# Patient Record
Sex: MALE | Race: OTHER | HISPANIC OR LATINO | Employment: FULL TIME | URBAN - METROPOLITAN AREA
[De-identification: names, ages, dates, MRNs, and addresses within clinical notes are randomized per-mention and may not be internally consistent; named-entity substitution may affect disease eponyms.]

---

## 2018-04-13 LAB
ABSOL LYMPHOCYTES (HISTORICAL): 4.9 K/UL (ref 0.5–4)
ALBUMIN SERPL BCP-MCNC: 4.9 G/DL (ref 3–5.2)
ALP SERPL-CCNC: 66 U/L (ref 43–122)
ALT SERPL W P-5'-P-CCNC: 27 U/L (ref 9–52)
ANION GAP SERPL CALCULATED.3IONS-SCNC: 14 MMOL/L (ref 5–14)
AST SERPL W P-5'-P-CCNC: 36 U/L (ref 17–59)
BILIRUB SERPL-MCNC: 0.5 MG/DL
BUN SERPL-MCNC: 18 MG/DL (ref 5–25)
CALCIUM SERPL-MCNC: 9.6 MG/DL (ref 8.4–10.2)
CHLORIDE SERPL-SCNC: 102 MEQ/L (ref 97–108)
CHOLEST SERPL-MCNC: 131 MG/DL
CHOLEST/HDLC SERPL: 2.5 {RATIO}
CO2 SERPL-SCNC: 25 MMOL/L (ref 22–30)
COMMENT (HISTORICAL): ABNORMAL
CREATINE, SERUM (HISTORICAL): 1.09 MG/DL (ref 0.7–1.5)
DEPRECATED RDW RBC AUTO: 13.2 %
EGFR (HISTORICAL): >60 ML/MIN/1.73 M2
EOSINOPHIL # BLD AUTO: 0.1 K/UL (ref 0–0.4)
EOSINOPHIL NFR BLD AUTO: 1 % (ref 0–6)
GLUCOSE SERPL-MCNC: 113 MG/DL (ref 70–99)
HCT VFR BLD AUTO: 44.8 % (ref 41–53)
HDLC SERPL-MCNC: 52 MG/DL
HEPATITIS A IGM ANTIBODY (HISTORICAL): NORMAL
HEPATITIS B CORE IGM ANTIBODY (HISTORICAL): NORMAL
HEPATITIS B SURFACE AG CONFIRMATION (HISTORICAL): NORMAL
HEPATITIS C ANTIBODY (HISTORICAL): NORMAL
HGB BLD-MCNC: 14.1 G/DL (ref 13.5–17.5)
LDL/HDL RATIO (HISTORICAL): 1.3
LDLC SERPL CALC-MCNC: 66 MG/DL
LYMPHOCYTES NFR BLD AUTO: 68 % (ref 25–45)
MCH RBC QN AUTO: 27.4 PG (ref 26–34)
MCHC RBC AUTO-ENTMCNC: 31.5 % (ref 31–36)
MCV RBC AUTO: 87 FL (ref 80–100)
MONOCYTES # BLD AUTO: 0.5 K/UL (ref 0.2–0.9)
MONOCYTES NFR BLD AUTO: 7 % (ref 1–10)
NEUTROPHILS ABS COUNT (HISTORICAL): 1.8 K/UL (ref 1.8–7.8)
NEUTS SEG NFR BLD AUTO: 24 % (ref 45–65)
PLATELET # BLD AUTO: 269 K/MCL (ref 150–450)
POTASSIUM SERPL-SCNC: 4.3 MEQ/L (ref 3.6–5)
RBC # BLD AUTO: 5.15 M/MCL (ref 4.5–5.9)
RBC MORPHOLOGY (HISTORICAL): ABNORMAL
SODIUM SERPL-SCNC: 141 MEQ/L (ref 137–147)
TOTAL PROTEIN (HISTORICAL): 7.9 G/DL (ref 5.9–8.4)
TRIGL SERPL-MCNC: 66 MG/DL
TSH SERPL DL<=0.05 MIU/L-ACNC: 0.75 UIU/ML (ref 0.47–4.68)
VLDLC SERPL CALC-MCNC: 13 MG/DL (ref 0–40)
WBC # BLD AUTO: 7.3 K/MCL (ref 4.5–11)

## 2018-04-15 LAB
EST. AVERAGE GLUCOSE BLD GHB EST-MCNC: 137 MG/DL
HBA1C MFR BLD HPLC: 6.4 %

## 2018-10-11 RX ORDER — PRAVASTATIN SODIUM 40 MG
1 TABLET ORAL
COMMUNITY
Start: 2018-04-13 | End: 2018-10-12 | Stop reason: SDUPTHER

## 2018-10-11 RX ORDER — ASPIRIN 81 MG/1
1 TABLET, CHEWABLE ORAL
COMMUNITY
Start: 2018-04-13 | End: 2018-10-12 | Stop reason: SDUPTHER

## 2018-10-11 RX ORDER — OLMESARTAN MEDOXOMIL, AMLODIPINE AND HYDROCHLOROTHIAZIDE TABLET 40/10/25 MG 40; 10; 25 MG/1; MG/1; MG/1
1 TABLET ORAL EVERY 24 HOURS
COMMUNITY
Start: 2018-04-13 | End: 2018-10-12 | Stop reason: SDUPTHER

## 2018-10-12 ENCOUNTER — OFFICE VISIT (OUTPATIENT)
Dept: FAMILY MEDICINE CLINIC | Facility: CLINIC | Age: 63
End: 2018-10-12
Payer: COMMERCIAL

## 2018-10-12 ENCOUNTER — APPOINTMENT (OUTPATIENT)
Dept: LAB | Facility: HOSPITAL | Age: 63
End: 2018-10-12
Payer: COMMERCIAL

## 2018-10-12 ENCOUNTER — TELEPHONE (OUTPATIENT)
Dept: FAMILY MEDICINE CLINIC | Facility: CLINIC | Age: 63
End: 2018-10-12

## 2018-10-12 VITALS
RESPIRATION RATE: 16 BRPM | OXYGEN SATURATION: 98 % | HEIGHT: 69 IN | HEART RATE: 81 BPM | WEIGHT: 180 LBS | BODY MASS INDEX: 26.66 KG/M2 | SYSTOLIC BLOOD PRESSURE: 130 MMHG | DIASTOLIC BLOOD PRESSURE: 80 MMHG | TEMPERATURE: 98 F

## 2018-10-12 DIAGNOSIS — I10 ESSENTIAL HYPERTENSION: ICD-10-CM

## 2018-10-12 DIAGNOSIS — R73.9 HYPERGLYCEMIA: Primary | ICD-10-CM

## 2018-10-12 DIAGNOSIS — R73.9 HYPERGLYCEMIA: ICD-10-CM

## 2018-10-12 LAB
ALBUMIN SERPL BCP-MCNC: 5.1 G/DL (ref 3–5.2)
ALP SERPL-CCNC: 66 U/L (ref 43–122)
ALT SERPL W P-5'-P-CCNC: 25 U/L (ref 9–52)
ANION GAP SERPL CALCULATED.3IONS-SCNC: 9 MMOL/L (ref 5–14)
AST SERPL W P-5'-P-CCNC: 38 U/L (ref 17–59)
BILIRUB SERPL-MCNC: 0.7 MG/DL
BUN SERPL-MCNC: 18 MG/DL (ref 5–25)
CALCIUM SERPL-MCNC: 10.1 MG/DL (ref 8.4–10.2)
CHLORIDE SERPL-SCNC: 101 MMOL/L (ref 97–108)
CHOLEST SERPL-MCNC: 132 MG/DL
CO2 SERPL-SCNC: 32 MMOL/L (ref 22–30)
CREAT SERPL-MCNC: 1.04 MG/DL (ref 0.7–1.5)
EST. AVERAGE GLUCOSE BLD GHB EST-MCNC: 128 MG/DL
GFR SERPL CREATININE-BSD FRML MDRD: 77 ML/MIN/1.73SQ M
GLUCOSE SERPL-MCNC: 113 MG/DL (ref 70–99)
HBA1C MFR BLD: 6.1 % (ref 4.2–6.3)
HDLC SERPL-MCNC: 54 MG/DL (ref 40–59)
LDLC SERPL CALC-MCNC: 67 MG/DL
POTASSIUM SERPL-SCNC: 4.4 MMOL/L (ref 3.6–5)
PROT SERPL-MCNC: 8.8 G/DL (ref 5.9–8.4)
SODIUM SERPL-SCNC: 142 MMOL/L (ref 137–147)
TRIGL SERPL-MCNC: 53 MG/DL

## 2018-10-12 PROCEDURE — 80061 LIPID PANEL: CPT

## 2018-10-12 PROCEDURE — 36415 COLL VENOUS BLD VENIPUNCTURE: CPT

## 2018-10-12 PROCEDURE — 80053 COMPREHEN METABOLIC PANEL: CPT

## 2018-10-12 PROCEDURE — 83036 HEMOGLOBIN GLYCOSYLATED A1C: CPT

## 2018-10-12 PROCEDURE — 3075F SYST BP GE 130 - 139MM HG: CPT | Performed by: FAMILY MEDICINE

## 2018-10-12 PROCEDURE — 3079F DIAST BP 80-89 MM HG: CPT | Performed by: FAMILY MEDICINE

## 2018-10-12 PROCEDURE — 99213 OFFICE O/P EST LOW 20 MIN: CPT | Performed by: FAMILY MEDICINE

## 2018-10-12 RX ORDER — OLMESARTAN MEDOXOMIL, AMLODIPINE AND HYDROCHLOROTHIAZIDE TABLET 40/10/25 MG 40; 10; 25 MG/1; MG/1; MG/1
1 TABLET ORAL EVERY 24 HOURS
Qty: 30 TABLET | Refills: 5 | Status: SHIPPED | OUTPATIENT
Start: 2018-10-12 | End: 2019-01-24 | Stop reason: SDUPTHER

## 2018-10-12 RX ORDER — PRAVASTATIN SODIUM 40 MG
40 TABLET ORAL DAILY
Qty: 30 TABLET | Refills: 5 | Status: SHIPPED | OUTPATIENT
Start: 2018-10-12 | End: 2019-01-24 | Stop reason: SDUPTHER

## 2018-10-12 RX ORDER — ASPIRIN 81 MG/1
81 TABLET, CHEWABLE ORAL DAILY
Qty: 30 TABLET | Refills: 5 | Status: SHIPPED | OUTPATIENT
Start: 2018-10-12 | End: 2019-01-24 | Stop reason: ALTCHOICE

## 2018-10-12 NOTE — PATIENT INSTRUCTIONS
Plan de alimentación con "enfoque dietético para detener la hipertensión (DASH, por mya siglas en inglés)   CUIDADO AMBULATORIO:   El plan de alimentación DASH (enfoques dietéticos para detener la hipertensión)  está diseñado para ayudar a prevenir o disminuir la presión arterial clotilde  También puede ayudar a bajar el colesterol viktor (colesterol LDL) y disminuír dickson riesgo de enfermedad cardíaca  El plan es bajo en sodio, azúcar, grasas dañinas, y grasas en dickson totalidad  Es alto en potasio, calcio, magnesio y [place]  Estos nutrientes se agregan al consumir más frutas, vegetales y granos enteros  Dickson límite de sodio cada día: Dickson dietista le indicará la cantidad de sodio que usted debe consumir a diario  La gente que tiene la presión arterial clotilde debe consumir de 1,500 a 2,300 mg de sodio al día thelma faviola  Umair cucharadita (cdta) de sal tiene 2,300 mg de sodio  [place] puede parecer thelma umair meta difícil, demi pequeños cambios en los alimentos que usted consume pueden hacer umair gran diferencia  Dickson médico o dietista puede ayudarlo a crear un plan alimenticio que cumpla dickson límite de sodio  Formas de limitar el sodio:   · [place] las etiquetas de los alimentos  Las etiquetas pueden ayudarle a escoger alimentos bajos en sodio  La cantidad de sodio está incluida en miligramos (mg)  La columna del porcentaje de valor diario indica la cantidad de necesidades diarias satisfechas con 1 porción del alimento para cada nutriente en la lista  Escoja alimentos que tengan menos de 5% del porcentaje diario de [place]  Estos alimentos se consideran bajos en sodio  Los alimentos que tienen 20% o más del porcentaje diario de sodio se consideran alimentos altos en sodio  Evite alimentos que tengan más de 300 mg de sodio por porción  Escoja alimentos Maggie Dario diga que son bajos en sodio, con sodio reducido, o sin sal agregada             · Evite la sal   No le agregue sal a la comida cuando se siente a la montiel a comer, y agregue muy poca sal a los alimentos maureen la cocción  Use hierbas y condimentos, thelma cebollas, ajo y especias sin sal para agregar sabor a los alimentos  Use jugo de lima, richelle o vinagre para darle a los alimentos un sabor ácido  Use chiles picantes o umair cantidad pequeña de salsa picante para agregar un sabor picante a los alimentos  · Pregunte sobre los sustitutos para la sal   Pregúntele a dickson médico si es posible usar sustitutos de la sal  Algunos sustitutos de la sal vienen con ingredientes que pueden ser dañinos si usted tiene ciertos padecimientos médicos  · Escoja los alimentos cuidadosamente cuando sale a comer a restaurantes:  las comidas de los restaurantes, sobre todo restaurantes de comida rápida, carlos siempre son altas en sodio  Algunos restaurantes ofrecen información nutricional que indica la cantidad de sodio en mya alimentos  Pida que preparen mya comidas con menos sal o sin sal   Lo que necesita saber acerca de las grasas:   · Incluya grasas saludables  Las grasas insaturadas y los ácidos grasos omega-3 son ejemplos de grasas saludables  Las grasas insaturadas se encuentran en los aceites de soja, canola, [place] o [place] y la margarina [facility] y [facility]  Los ácidos grasos [place] 3 se encuentran en el pescado con grasa, thelma el salmón, el atún, la caballa y las joy  También se le puede encontrar en el aceita de linaza y en la linaza molida  · Evite las grasas insalubres  No consuma grasas dañinas, thelma grasas saturadas y grasas trans  Las grasas saturadas se encuentran en alimentos que contienen grasa animal  Zuleyka Alcantar son [facility] grasosas, la [place], la [facility] [place], la crema y otros productos lácteos  Además se pueden encontrar en la [place], la margarina en brendan, el aceite de pool y el aceite de gi  Las grasas trans se encuentran en comidas fritas, galletas estilo soda, tortillitas tostadas, y alimentos horneados hechos con [place] Arrant    Alimentos que puede incluir:  Con el plan de alimentación DASH, usted necesita consumir un número específico de porciones de cada vinnie de alimentos  [place] le ayudará a consumir las cantidades suficientes de ciertos nutrientes y limitar otros  La cantidad de porciones que usted debe comer depende de la cantidad de calorías que usted necesita  [place] dietista le informará sobre cuántas calorías necesita usted  El número de porciones que viene en la lista al lado de los grupos alimenticios a continuación es para las personas que necesitan aproximadamente 2,000 calorías al día    · Granos:  6 a 8 porciones (3 de estas porciones deben ser de alimentos de granos enteros o integrales)    ¨ 1 tajada de pan integral     ¨ 1 onza de cereal seco    ¨ ½ taza de cereal cocinado, pasta, o arroz integral    · Verduras y frutas:  4 a 5 porciones de frutas y 4 a 5 porciones de vegetales    ¨ 1 fruta mediana    ¨ ½ taza de vegetales congelados, enlatados (sin sal adicional), o vegetales frescos y picados     ¨ ½ taza de fruta fresca, congelada, seca o enlatada (enlatada en sirope liviano o jugo de fruta)    ¨ ½ taza de jugo de verduras o frutas    · Productos lácteos:  2 a 3 porciones    ¨ 1 taza de [employer] o leche 1%    ¨ 1½ onzas de queso descremado o bajo en grasas y bajo en sodio    ¨ 6 onzas de yogurt descremado o bajo en grasas    · Sarmad baron, sarmad feroz y pescado magros:  6 onzas o menos    ¨ Sarmad feroz (elise, pavo) sin piel    ¨ Pescado (sobre todo pescado con grasa, thelma salmón, atún fresco o FUENTES)    ¨ Carne de res o de cerdo magra (lomo, carne molida extra [facility])    ¨ Claras de huevo y sustitutos del huevo    · Douglas du sac, semillas y legumbres:  4 a 5 porciones por semana    ¨ ½ taza de frijoles y arbejas cocinadas    ¨ 1½ onzas de nueces sin sal    ¨ 2 cucharadas de mantequilla de maní o semillas    · Dulces y azúcares adicionales:  5 o menos por semana    ¨ 1 cucharada de azúcar, jalea o mermelada    ¨ ½ taza de sorbeto o gelatina    ¨ 1 taza de limonada    · Grasas:  2 a 3 porciones por semana    ¨ 1 cucharadita de margarina suave o aceite vegetal    ¨ 1 cucharada de [facility]    ¨ 2 cucharadas de aderezo para ensaladas  Alimentos que se deben evitar:   · Granos:      ¨ Postres horneados o fritos thelma donas, pastelitos, galletas, y quequitos (altos en grasas y azúcar)    ¨ Mezclas para hacer pan de maíz y pasteles, alimentos empacados, thelma rellenos para pavo, mezclas para hacer arroz y pasta, macarrones con [place]-barre, y cereales instantáneos (altos en sodio)    · Frutas y verduras:      ¨ Vegetales regulares enlatados (altos en sodio)    ¨ Repollo preparado en vinagre, vegetales en vinagre, y otros alimentos preparados en escabeche (altos en sodio)    ¨ Vegetales fritos o vegetales en mantequilla o salsas altas en grasas    ¨ Frutas en crema o salsa de mantequilla (altas en grasas)    · Productos lácteos:      ¨ Leche entera, leche semi descremada (2%), y crema (clotilde en grasas)    ¨ Queso regular y queso procesado (alto en grasa y sodio)    · Sarmad y alimentos con proteínas:      ¨ Sarmad ahumadas o curadas, thelma carne de fernandez en conserva, tocino, jamón, perros calientes, y salchicha (clotilde en grasas y sodio)    ¨ Frijoles enlatados y sarmad enlatadas o sarmad en pasta, thelma sarmad en conserva, joy, anchoas y [address] de imitación (altos en sodio)    ¨ Sarmad para emparedados, thelma [place], [place], [place], y carne de res en rebanada (altos en sodio)    ¨ Sarmad altas en grasas (bistec estilo T-bone, carne molida para hamburguesas, costillas)    ¨ Huevos enteros y yemas de huevo (altos en grasas)    · Otros:      ¨ Condimentos hechos con sal, thelma sal de ajo, sal de apio, sal de cebolla, sal condimentada, suavizantes para sarmad, y glutamato de monosodio (MSG, por mya siglas en inglés)    ¨ Sopa Miso y mezclas para sopa enlatadas o secas (altas en sodio)    ¨ Salsa de soya regular, salsa de [place], salsa Foreman, salsa para bistec, [place] [email or web address], y [address] [place] [address] Ave  vinagres con sabor (altas en sodio)    ¨ Condimentos regulares, thelma Orestes, salsa de tomate y aderezos para ensalada (altos en sodio)    ¨ Salsa para elenita y salsas en general, thelma salsa [place] o de [place] (altas en sodio y [place])    ¨ Bebidas altas en azúcar, thelma bebidas gaseosas o jugos de frutas    ¨ Alimentos para [address], thelma lani tostadas, palomitas de [place], pretzels, piel de cerdo, [address] de soda [facility], y nueces saladas    ¨ Alimentos congelados, thelma cenas preparadas, platos principales, vegetales con salsas, y elenita cubiertas en pan (altas en sodio)  Otras pautas que debe seguir:   · Mantenga un peso saludable  Dickson riesgo de enfermedad cardíaca es aún más alto si usted tiene sobrepeso  Dickson médico podría sugerirle que adelgace si tiene sobrepeso  Usted puede perder peso si se propone consumir menos calorías y alimentos que tengan azúcar y grasas agregadas  El plan de alimentación DASH puede ayudarle a lograrlo  Fannie buena forma de disminuir el consumo de calorías es consumiendo porciones más pequeñas en cada comida y menos meriendas entre comidas  Consulte a dickson médico para obtener más información sobre cómo adelgazar  · Realice actividad física con regularidad  El ejercicio regular puede ayudarle a alcanzar o mantener un peso saludable  El ejercicio regular también puede ayudarle a disminuir dickson presión arterial y mejorar mya niveles de colesterol  Collette ejercicios moderados por 30 minutos o más todos los días de la [place]  Para bajar peso, asegúrese de ejercitarse por lo menos 60 minutos  Consulte con dickson médico sobre un programa de ejercicio adecuado para usted  · Limite el consumo de alcohol  Las mujeres deberían limitar el consumo de alcohol a 1 bebida por día  Los hombres deberían limitar el consumo de alcohol a 2 tragos al día  Un trago equivale a 12 onzas de cerveza, 5 onzas de vino o 1 onza y ½ de licor    © 2017 [address] Information is for End User's use only and may not be sold, redistributed or otherwise used for commercial purposes  All illustrations and images included in CareNotes® are the copyrighted property of A D A M , Inc  or Ace Martin  Esta información es sólo para uso en educación  Dickson intención no es darle un consejo médico sobre enfermedades o tratamientos  Colsulte con dickson Selene Jorje farmacéutico antes de seguir cualquier régimen médico para saber si es seguro y efectivo para usted

## 2018-10-12 NOTE — PROGRESS NOTES
Assessment/Plan:  1  Hyperglycemia  Previous exam patient had pre diabetes  He lost intentionally 20 lb from the last visit  I recommend to continue to exercise and controlling carbs in the diet  I gave him written information about dash diet  - Hemoglobin A1C; Future    2  Essential hypertension  Blood pressure is in optimal control and current treatment and change of lifestyle  Continue same  Refilling medications  - Comprehensive metabolic panel; Future  - Lipid Panel with Direct LDL reflex; Future  - pravastatin (PRAVACHOL) 40 mg tablet; Take 1 tablet (40 mg total) by mouth daily  Dispense: 30 tablet; Refill: 5  - aspirin 81 mg chewable tablet; Chew 1 tablet (81 mg total) daily  Dispense: 30 tablet; Refill: 5  - Olmesartan-Amlodipine-HCTZ 40-10-25 MG TABS; Take 1 tablet by mouth every 24 hours  Dispense: 30 tablet; Refill: 5    No problem-specific Assessment & Plan notes found for this encounter  There are no diagnoses linked to this encounter  Subjective:      Patient ID: Betty Sharma is a 59 y o  male  Pt here for routine followup  He lost 20 lb from last visit six months ago after changing his diet and exercising more  He denies the chest pain, abdominal pain or blood in the stools  Hypertension   This is a recurrent problem  The current episode started more than 1 year ago  The problem is unchanged  The problem is uncontrolled  Pertinent negatives include no chest pain, headaches or shortness of breath  Risk factors for coronary artery disease include dyslipidemia and male gender  There are no compliance problems  The following portions of the patient's history were reviewed and updated as appropriate: allergies, current medications, past family history, past medical history, past social history, past surgical history and problem list     Review of Systems   Constitutional: Negative for activity change, appetite change, fatigue and fever     HENT: Negative for congestion, dental problem, ear pain, sinus pain and trouble swallowing  Eyes: Negative for discharge, redness and itching  Respiratory: Negative for cough, shortness of breath and wheezing  Cardiovascular: Negative for chest pain  Gastrointestinal: Negative for abdominal distention and abdominal pain  Genitourinary: Negative for difficulty urinating, dysuria and enuresis  Musculoskeletal: Negative for arthralgias, back pain and gait problem  Neurological: Negative for dizziness and headaches  Hematological: Negative for adenopathy  Does not bruise/bleed easily  Psychiatric/Behavioral: Negative for behavioral problems  Objective:      /90 (BP Location: Left arm, Patient Position: Sitting, Cuff Size: Standard)   Pulse 81   Temp 98 °F (36 7 °C) (Oral)   Resp 16   Ht 5' 8 5" (1 74 m)   Wt 81 6 kg (180 lb)   SpO2 98%   BMI 26 97 kg/m²          Physical Exam   Constitutional: He is oriented to person, place, and time  He appears well-developed and well-nourished  HENT:   Head: Normocephalic  Eyes: Pupils are equal, round, and reactive to light  Neck: Normal range of motion  Cardiovascular: Normal rate, regular rhythm and normal heart sounds  Pulmonary/Chest: Effort normal and breath sounds normal    Abdominal: Soft  Bowel sounds are normal    Genitourinary: Penis normal    Musculoskeletal: Normal range of motion  Neurological: He is alert and oriented to person, place, and time  He has normal reflexes  Skin: Skin is warm and dry  Psychiatric: He has a normal mood and affect   His behavior is normal  Judgment and thought content normal

## 2018-10-12 NOTE — TELEPHONE ENCOUNTER
I spoke with patient regarding labs are improved still with high risk of diabetes, continue exercising low carb and low   salt diet and same medications

## 2019-01-24 DIAGNOSIS — I10 ESSENTIAL HYPERTENSION: ICD-10-CM

## 2019-01-24 RX ORDER — PRAVASTATIN SODIUM 40 MG
40 TABLET ORAL DAILY
Qty: 90 TABLET | Refills: 3 | Status: SHIPPED | OUTPATIENT
Start: 2019-01-24 | End: 2019-06-17 | Stop reason: SDUPTHER

## 2019-01-24 RX ORDER — OLMESARTAN MEDOXOMIL, AMLODIPINE AND HYDROCHLOROTHIAZIDE TABLET 40/10/25 MG 40; 10; 25 MG/1; MG/1; MG/1
1 TABLET ORAL EVERY 24 HOURS
Qty: 90 TABLET | Refills: 3 | Status: SHIPPED | OUTPATIENT
Start: 2019-01-24 | End: 2019-06-17 | Stop reason: SDUPTHER

## 2019-06-17 ENCOUNTER — OFFICE VISIT (OUTPATIENT)
Dept: FAMILY MEDICINE CLINIC | Facility: CLINIC | Age: 64
End: 2019-06-17
Payer: COMMERCIAL

## 2019-06-17 VITALS
OXYGEN SATURATION: 98 % | TEMPERATURE: 98.1 F | DIASTOLIC BLOOD PRESSURE: 80 MMHG | HEIGHT: 68 IN | RESPIRATION RATE: 16 BRPM | BODY MASS INDEX: 28.19 KG/M2 | WEIGHT: 186 LBS | SYSTOLIC BLOOD PRESSURE: 132 MMHG | HEART RATE: 92 BPM

## 2019-06-17 DIAGNOSIS — R73.9 HYPERGLYCEMIA: ICD-10-CM

## 2019-06-17 DIAGNOSIS — Z00.01 ENCOUNTER FOR GENERAL ADULT MEDICAL EXAMINATION WITH ABNORMAL FINDINGS: ICD-10-CM

## 2019-06-17 DIAGNOSIS — I10 ESSENTIAL HYPERTENSION: ICD-10-CM

## 2019-06-17 DIAGNOSIS — I10 BENIGN ESSENTIAL HYPERTENSION: ICD-10-CM

## 2019-06-17 DIAGNOSIS — Z23 NEED FOR TDAP VACCINATION: Primary | ICD-10-CM

## 2019-06-17 PROCEDURE — 90471 IMMUNIZATION ADMIN: CPT | Performed by: FAMILY MEDICINE

## 2019-06-17 PROCEDURE — 99396 PREV VISIT EST AGE 40-64: CPT | Performed by: FAMILY MEDICINE

## 2019-06-17 PROCEDURE — 90715 TDAP VACCINE 7 YRS/> IM: CPT | Performed by: FAMILY MEDICINE

## 2019-06-17 RX ORDER — PRAVASTATIN SODIUM 40 MG
40 TABLET ORAL DAILY
Qty: 90 TABLET | Refills: 3 | Status: SHIPPED | OUTPATIENT
Start: 2019-06-17 | End: 2020-02-20 | Stop reason: SDUPTHER

## 2019-06-17 RX ORDER — OLMESARTAN MEDOXOMIL, AMLODIPINE AND HYDROCHLOROTHIAZIDE TABLET 40/10/25 MG 40; 10; 25 MG/1; MG/1; MG/1
1 TABLET ORAL EVERY 24 HOURS
Qty: 90 TABLET | Refills: 3 | Status: SHIPPED | OUTPATIENT
Start: 2019-06-17 | End: 2020-02-20 | Stop reason: SDUPTHER

## 2019-06-17 RX ORDER — ASPIRIN 81 MG/1
TABLET, CHEWABLE ORAL
Refills: 5 | COMMUNITY
Start: 2019-05-22 | End: 2019-06-17 | Stop reason: ALTCHOICE

## 2019-10-22 DIAGNOSIS — I10 ESSENTIAL HYPERTENSION: ICD-10-CM

## 2019-10-22 RX ORDER — ASPIRIN 81 MG/1
TABLET, CHEWABLE ORAL
Qty: 30 TABLET | Refills: 5 | OUTPATIENT
Start: 2019-10-22

## 2019-11-05 DIAGNOSIS — I10 ESSENTIAL HYPERTENSION: ICD-10-CM

## 2019-11-05 RX ORDER — ASPIRIN 81 MG/1
TABLET, CHEWABLE ORAL
Qty: 30 TABLET | Refills: 5 | OUTPATIENT
Start: 2019-11-05

## 2020-02-20 ENCOUNTER — OFFICE VISIT (OUTPATIENT)
Dept: FAMILY MEDICINE CLINIC | Facility: CLINIC | Age: 65
End: 2020-02-20
Payer: COMMERCIAL

## 2020-02-20 VITALS
OXYGEN SATURATION: 97 % | SYSTOLIC BLOOD PRESSURE: 146 MMHG | DIASTOLIC BLOOD PRESSURE: 58 MMHG | HEART RATE: 91 BPM | HEIGHT: 68 IN | BODY MASS INDEX: 28.37 KG/M2 | WEIGHT: 187.2 LBS

## 2020-02-20 DIAGNOSIS — Z13.1 ENCOUNTER FOR SCREENING EXAMINATION FOR IMPAIRED GLUCOSE REGULATION AND DIABETES MELLITUS: ICD-10-CM

## 2020-02-20 DIAGNOSIS — R53.83 OTHER FATIGUE: ICD-10-CM

## 2020-02-20 DIAGNOSIS — Z00.01 ENCOUNTER FOR WELL ADULT EXAM WITH ABNORMAL FINDINGS: ICD-10-CM

## 2020-02-20 DIAGNOSIS — I10 ESSENTIAL HYPERTENSION: Primary | ICD-10-CM

## 2020-02-20 PROCEDURE — 1036F TOBACCO NON-USER: CPT | Performed by: NURSE PRACTITIONER

## 2020-02-20 PROCEDURE — 3008F BODY MASS INDEX DOCD: CPT | Performed by: NURSE PRACTITIONER

## 2020-02-20 PROCEDURE — 99214 OFFICE O/P EST MOD 30 MIN: CPT | Performed by: NURSE PRACTITIONER

## 2020-02-20 PROCEDURE — 3078F DIAST BP <80 MM HG: CPT | Performed by: NURSE PRACTITIONER

## 2020-02-20 PROCEDURE — 3077F SYST BP >= 140 MM HG: CPT | Performed by: NURSE PRACTITIONER

## 2020-02-20 RX ORDER — OLMESARTAN MEDOXOMIL, AMLODIPINE AND HYDROCHLOROTHIAZIDE TABLET 40/10/25 MG 40; 10; 25 MG/1; MG/1; MG/1
1 TABLET ORAL EVERY 24 HOURS
Qty: 90 TABLET | Refills: 3 | Status: SHIPPED | OUTPATIENT
Start: 2020-02-20 | End: 2020-11-05

## 2020-02-20 RX ORDER — PRAVASTATIN SODIUM 40 MG
40 TABLET ORAL DAILY
Qty: 90 TABLET | Refills: 3 | Status: SHIPPED | OUTPATIENT
Start: 2020-02-20 | End: 2020-11-05

## 2020-02-20 NOTE — ASSESSMENT & PLAN NOTE
-With optimal control  Patient denies chest pain/fatigue/sob  States that when he comes to doctors his BP goes up for many years at home its usually normal      Reviewed with patient blood pressure target goal   Continue to maintain healthy balanced diet with focus on low-salt intake and limit alcohol intake  To follow DASH diet ( 3 servings of fruit/vegetables) daily  Weight loss to BMI less than 30 along with increasing physical activity to 3- 5 times/week for 30 minutes a day or as tolerated  -To continue on BP medication no changes made today

## 2020-02-20 NOTE — PROGRESS NOTES
Assessment/Plan:    Essential hypertension  -With optimal control  Patient denies chest pain/fatigue/sob  States that when he comes to doctors his BP goes up for many years at home its usually normal      Reviewed with patient blood pressure target goal   Continue to maintain healthy balanced diet with focus on low-salt intake and limit alcohol intake  To follow DASH diet ( 3 servings of fruit/vegetables) daily  Weight loss to BMI less than 30 along with increasing physical activity to 3- 5 times/week for 30 minutes a day or as tolerated  -To continue on BP medication no changes made today  Diagnoses and all orders for this visit:    Essential hypertension  -     Comprehensive metabolic panel; Future  -     Lipid panel; Future  -     Olmesartan-amLODIPine-HCTZ 40-10-25 MG TABS; Take 1 tablet by mouth every 24 hours  -     pravastatin (PRAVACHOL) 40 mg tablet; Take 1 tablet (40 mg total) by mouth daily    Encounter for well adult exam with abnormal findings    Other fatigue  -     CBC and differential; Future  -     TSH, 3rd generation with Free T4 reflex; Future    Encounter for screening examination for impaired glucose regulation and diabetes mellitus  -     Hemoglobin A1C; Future    Other orders  -     Cancel: HIV 1/2 AG-AB combo; Future  -     Cancel: CBC and differential; Future  -     Cancel: Lipid Panel with Direct LDL reflex; Future  -     Cancel: TSH, 3rd generation with Free T4 reflex; Future          Subjective:      Patient ID: Blayne Patterson is a 62 y o  male  62year old male patient here for follow up on his hypertension  Today he feels well  States his BP is usually normal at home but when he comes here it usually out of wack per patient  Has been coming here for many years  Works as a   Was not able to get his labs done when indicated during last visit in June 2019 but will get them done for his upcoming physical he rescheduled to August 2020      Hypertension   This is a recurrent problem  The current episode started more than 1 year ago  The problem has been waxing and waning since onset  The problem is uncontrolled  Pertinent negatives include no blurred vision, chest pain, headaches, neck pain, palpitations, peripheral edema, shortness of breath or sweats  There are no associated agents to hypertension  Risk factors for coronary artery disease include male gender, sedentary lifestyle and family history  The current treatment provides moderate improvement  Compliance problems include exercise and diet  There is no history of angina, CAD/MI, CVA or left ventricular hypertrophy  There is no history of chronic renal disease, sleep apnea or a thyroid problem  The following portions of the patient's history were reviewed and updated as appropriate: allergies, current medications, past family history, past medical history, past social history, past surgical history and problem list     Review of Systems   Constitutional: Negative  Negative for appetite change, diaphoresis, fatigue and fever  HENT: Negative  Eyes: Negative  Negative for blurred vision  Respiratory: Negative  Negative for cough, chest tightness, shortness of breath and wheezing  Cardiovascular: Negative  Negative for chest pain, palpitations and leg swelling  Gastrointestinal: Negative  Endocrine: Negative  Genitourinary: Negative  Musculoskeletal: Negative  Negative for neck pain  Skin: Negative  Allergic/Immunologic: Negative  Neurological: Negative  Negative for headaches  Hematological: Negative  Psychiatric/Behavioral: Negative  Objective:      /58 (BP Location: Left arm, Patient Position: Sitting, Cuff Size: Adult)   Pulse 91   Ht 5' 8" (1 727 m)   Wt 84 9 kg (187 lb 3 2 oz)   SpO2 97%   BMI 28 46 kg/m²          Physical Exam   Constitutional: He is oriented to person, place, and time  He appears well-developed and well-nourished  No distress     HENT:   Head: Normocephalic and atraumatic  Right Ear: External ear normal    Left Ear: External ear normal    Eyes: Pupils are equal, round, and reactive to light  EOM are normal    Neck: Normal range of motion  Neck supple  Cardiovascular: Normal rate, regular rhythm, normal heart sounds and intact distal pulses  Exam reveals no gallop and no friction rub  No murmur heard  No pedal edema   Pulmonary/Chest: Effort normal and breath sounds normal  No respiratory distress  He has no wheezes  Abdominal: Soft  Bowel sounds are normal    Musculoskeletal: Normal range of motion  Lymphadenopathy:     He has no cervical adenopathy  Neurological: He is alert and oriented to person, place, and time  Skin: Skin is warm and dry  Capillary refill takes less than 2 seconds  He is not diaphoretic  Psychiatric: He has a normal mood and affect  His behavior is normal  Thought content normal    Nursing note and vitals reviewed

## 2020-06-22 ENCOUNTER — TELEPHONE (OUTPATIENT)
Dept: FAMILY MEDICINE CLINIC | Facility: CLINIC | Age: 65
End: 2020-06-22

## 2020-06-29 ENCOUNTER — APPOINTMENT (OUTPATIENT)
Dept: LAB | Facility: HOSPITAL | Age: 65
End: 2020-06-29
Payer: COMMERCIAL

## 2020-06-29 DIAGNOSIS — I10 ESSENTIAL HYPERTENSION: ICD-10-CM

## 2020-06-29 DIAGNOSIS — R53.83 OTHER FATIGUE: ICD-10-CM

## 2020-06-29 DIAGNOSIS — Z13.1 ENCOUNTER FOR SCREENING EXAMINATION FOR IMPAIRED GLUCOSE REGULATION AND DIABETES MELLITUS: ICD-10-CM

## 2020-06-29 LAB
ALBUMIN SERPL BCP-MCNC: 4.7 G/DL (ref 3–5.2)
ALP SERPL-CCNC: 57 U/L (ref 43–122)
ALT SERPL W P-5'-P-CCNC: 13 U/L (ref 9–52)
ANION GAP SERPL CALCULATED.3IONS-SCNC: 8 MMOL/L (ref 5–14)
AST SERPL W P-5'-P-CCNC: 31 U/L (ref 17–59)
BILIRUB SERPL-MCNC: 0.4 MG/DL
BUN SERPL-MCNC: 19 MG/DL (ref 5–25)
CALCIUM SERPL-MCNC: 10.1 MG/DL (ref 8.4–10.2)
CHLORIDE SERPL-SCNC: 103 MMOL/L (ref 97–108)
CHOLEST SERPL-MCNC: 139 MG/DL
CO2 SERPL-SCNC: 27 MMOL/L (ref 22–30)
CREAT SERPL-MCNC: 1.17 MG/DL (ref 0.7–1.5)
EOSINOPHIL # BLD AUTO: 0.09 THOUSAND/UL (ref 0–0.4)
EOSINOPHIL NFR BLD MANUAL: 1 % (ref 0–6)
ERYTHROCYTE [DISTWIDTH] IN BLOOD BY AUTOMATED COUNT: 13.8 %
EST. AVERAGE GLUCOSE BLD GHB EST-MCNC: 120 MG/DL
GFR SERPL CREATININE-BSD FRML MDRD: 65 ML/MIN/1.73SQ M
GLUCOSE P FAST SERPL-MCNC: 126 MG/DL (ref 70–99)
HBA1C MFR BLD: 5.8 %
HCT VFR BLD AUTO: 43.5 % (ref 41–53)
HDLC SERPL-MCNC: 50 MG/DL
HGB BLD-MCNC: 14.7 G/DL (ref 13.5–17.5)
LDLC SERPL CALC-MCNC: 73 MG/DL
LG PLATELETS BLD QL SMEAR: PRESENT
LYMPHOCYTES # BLD AUTO: 4.14 THOUSAND/UL (ref 0.5–4)
LYMPHOCYTES # BLD AUTO: 46 % (ref 25–45)
MCH RBC QN AUTO: 29.7 PG (ref 26–34)
MCHC RBC AUTO-ENTMCNC: 33.7 G/DL (ref 31–36)
MCV RBC AUTO: 88 FL (ref 80–100)
MONOCYTES # BLD AUTO: 0.45 THOUSAND/UL (ref 0.2–0.9)
MONOCYTES NFR BLD AUTO: 5 % (ref 1–10)
NEUTS BAND NFR BLD MANUAL: 1 % (ref 0–8)
NEUTS SEG # BLD: 3.78 THOUSAND/UL (ref 1.8–7.8)
NEUTS SEG NFR BLD AUTO: 41 %
NONHDLC SERPL-MCNC: 89 MG/DL
PLATELET # BLD AUTO: 266 THOUSANDS/UL (ref 150–450)
PLATELET BLD QL SMEAR: ADEQUATE
PMV BLD AUTO: 9.1 FL (ref 8.9–12.7)
POTASSIUM SERPL-SCNC: 5 MMOL/L (ref 3.6–5)
PROT SERPL-MCNC: 8.4 G/DL (ref 5.9–8.4)
RBC # BLD AUTO: 4.93 MILLION/UL (ref 4.5–5.9)
RBC MORPH BLD: NORMAL
SODIUM SERPL-SCNC: 138 MMOL/L (ref 137–147)
TOTAL CELLS COUNTED SPEC: 100
TRIGL SERPL-MCNC: 78 MG/DL
TSH SERPL DL<=0.05 MIU/L-ACNC: 1.14 UIU/ML (ref 0.47–4.68)
VARIANT LYMPHS # BLD AUTO: 6 % (ref 0–0)
WBC # BLD AUTO: 9 THOUSAND/UL (ref 4.5–11)

## 2020-06-29 PROCEDURE — 85007 BL SMEAR W/DIFF WBC COUNT: CPT

## 2020-06-29 PROCEDURE — 36415 COLL VENOUS BLD VENIPUNCTURE: CPT

## 2020-06-29 PROCEDURE — 84443 ASSAY THYROID STIM HORMONE: CPT

## 2020-06-29 PROCEDURE — 83036 HEMOGLOBIN GLYCOSYLATED A1C: CPT

## 2020-06-29 PROCEDURE — 80053 COMPREHEN METABOLIC PANEL: CPT

## 2020-06-29 PROCEDURE — 80061 LIPID PANEL: CPT

## 2020-06-29 PROCEDURE — 85027 COMPLETE CBC AUTOMATED: CPT

## 2020-08-14 ENCOUNTER — LAB (OUTPATIENT)
Dept: LAB | Facility: HOSPITAL | Age: 65
End: 2020-08-14
Payer: COMMERCIAL

## 2020-08-14 ENCOUNTER — OFFICE VISIT (OUTPATIENT)
Dept: FAMILY MEDICINE CLINIC | Facility: CLINIC | Age: 65
End: 2020-08-14
Payer: COMMERCIAL

## 2020-08-14 VITALS
SYSTOLIC BLOOD PRESSURE: 120 MMHG | WEIGHT: 190 LBS | TEMPERATURE: 97.8 F | DIASTOLIC BLOOD PRESSURE: 80 MMHG | RESPIRATION RATE: 16 BRPM | HEART RATE: 80 BPM | OXYGEN SATURATION: 97 % | HEIGHT: 68 IN | BODY MASS INDEX: 28.79 KG/M2

## 2020-08-14 DIAGNOSIS — R73.9 HYPERGLYCEMIA: ICD-10-CM

## 2020-08-14 DIAGNOSIS — Z00.01 ENCOUNTER FOR GENERAL ADULT MEDICAL EXAMINATION WITH ABNORMAL FINDINGS: ICD-10-CM

## 2020-08-14 DIAGNOSIS — Z11.4 SCREENING FOR HUMAN IMMUNODEFICIENCY VIRUS: ICD-10-CM

## 2020-08-14 DIAGNOSIS — I10 ESSENTIAL HYPERTENSION: ICD-10-CM

## 2020-08-14 DIAGNOSIS — Z00.01 ENCOUNTER FOR GENERAL ADULT MEDICAL EXAMINATION WITH ABNORMAL FINDINGS: Primary | ICD-10-CM

## 2020-08-14 LAB
ALBUMIN SERPL BCP-MCNC: 5.3 G/DL (ref 3–5.2)
ALP SERPL-CCNC: 60 U/L (ref 43–122)
ALT SERPL W P-5'-P-CCNC: 17 U/L (ref 9–52)
ANION GAP SERPL CALCULATED.3IONS-SCNC: 13 MMOL/L (ref 5–14)
AST SERPL W P-5'-P-CCNC: 36 U/L (ref 17–59)
BASOPHILS # BLD AUTO: 0 THOUSANDS/ΜL (ref 0–0.1)
BASOPHILS NFR BLD AUTO: 1 % (ref 0–1)
BILIRUB SERPL-MCNC: 0.6 MG/DL
BUN SERPL-MCNC: 22 MG/DL (ref 5–25)
CALCIUM ALBUM COR SERPL-MCNC: 9.3 MG/DL (ref 8.3–10.1)
CALCIUM SERPL-MCNC: 10.3 MG/DL (ref 8.4–10.2)
CHLORIDE SERPL-SCNC: 101 MMOL/L (ref 97–108)
CHOLEST SERPL-MCNC: 183 MG/DL
CO2 SERPL-SCNC: 26 MMOL/L (ref 22–30)
CREAT SERPL-MCNC: 1.03 MG/DL (ref 0.7–1.5)
EOSINOPHIL # BLD AUTO: 0.1 THOUSAND/ΜL (ref 0–0.4)
EOSINOPHIL NFR BLD AUTO: 2 % (ref 0–6)
ERYTHROCYTE [DISTWIDTH] IN BLOOD BY AUTOMATED COUNT: 13.1 %
EST. AVERAGE GLUCOSE BLD GHB EST-MCNC: 126 MG/DL
GFR SERPL CREATININE-BSD FRML MDRD: 76 ML/MIN/1.73SQ M
GLUCOSE P FAST SERPL-MCNC: 134 MG/DL (ref 70–99)
HBA1C MFR BLD: 6 %
HCT VFR BLD AUTO: 45.3 % (ref 41–53)
HDLC SERPL-MCNC: 59 MG/DL
HGB BLD-MCNC: 15.5 G/DL (ref 13.5–17.5)
LDLC SERPL CALC-MCNC: 110 MG/DL
LYMPHOCYTES # BLD AUTO: 3 THOUSANDS/ΜL (ref 0.5–4)
LYMPHOCYTES NFR BLD AUTO: 48 % (ref 25–45)
MCH RBC QN AUTO: 29.8 PG (ref 26–34)
MCHC RBC AUTO-ENTMCNC: 34.2 G/DL (ref 31–36)
MCV RBC AUTO: 87 FL (ref 80–100)
MONOCYTES # BLD AUTO: 0.6 THOUSAND/ΜL (ref 0.2–0.9)
MONOCYTES NFR BLD AUTO: 10 % (ref 1–10)
NEUTROPHILS # BLD AUTO: 2.6 THOUSANDS/ΜL (ref 1.8–7.8)
NEUTS SEG NFR BLD AUTO: 41 % (ref 45–65)
NONHDLC SERPL-MCNC: 124 MG/DL
PLATELET # BLD AUTO: 254 THOUSANDS/UL (ref 150–450)
PMV BLD AUTO: 8 FL (ref 8.9–12.7)
POTASSIUM SERPL-SCNC: 4.5 MMOL/L (ref 3.6–5)
PROT SERPL-MCNC: 9.3 G/DL (ref 5.9–8.4)
RBC # BLD AUTO: 5.2 MILLION/UL (ref 4.5–5.9)
SODIUM SERPL-SCNC: 140 MMOL/L (ref 137–147)
TRIGL SERPL-MCNC: 70 MG/DL
WBC # BLD AUTO: 6.4 THOUSAND/UL (ref 4.5–11)

## 2020-08-14 PROCEDURE — 99396 PREV VISIT EST AGE 40-64: CPT | Performed by: FAMILY MEDICINE

## 2020-08-14 PROCEDURE — 36415 COLL VENOUS BLD VENIPUNCTURE: CPT

## 2020-08-14 PROCEDURE — 83036 HEMOGLOBIN GLYCOSYLATED A1C: CPT

## 2020-08-14 PROCEDURE — 80053 COMPREHEN METABOLIC PANEL: CPT

## 2020-08-14 PROCEDURE — 85025 COMPLETE CBC W/AUTO DIFF WBC: CPT

## 2020-08-14 PROCEDURE — 80061 LIPID PANEL: CPT

## 2020-08-14 PROCEDURE — 87389 HIV-1 AG W/HIV-1&-2 AB AG IA: CPT

## 2020-08-14 NOTE — PATIENT INSTRUCTIONS
Hipertensión crónica   CUIDADO AMBULATORIO:   Hipertensión  es la presión arterial clotilde  La presión arterial es la fuerza que ejerce la ankit contra las thompson de las arterias  La presión arterial normal debería estar a menos de 120/80  La pre-hipertensión estaría entre 120/80 y 139/ 80  La presión arterial clotilde estaría a 140/90 o más clotilde  La hipertensión causa que dickson presión arterial se eleve tanto que dickson corazón se ve forzado a trabajar [employer] de lo normal  [place] puede dañar dickson corazón  La hipertensión crónica es umair condición de khoa plazo que usted puede controlar con un estilo de mike eveline o con medicamentos  La presión [place] a proteger mya órganos thelma dickson corazón, pulmones, cerebro, y riñones  Los síntomas más comunes incluyen los siguientes:   · Dolor de myra     · Visión borrosa    · Dolor de pecho     · Mareos o debilidad     · Dificultad para respirar     · Hemorragias nasales (sangrado de la [place])  Llame al 911 en dominick de presentar lo siguiente:   · Usted tiene malestar en el pecho que se siente thelma estrujamiento, presión, [place]y [place] o dolor  · Usted se siente confundido o tiene dificultad para hablar  · Repentinamente se siente aturdido o con dificultad para respirar  · Usted tiene dolor o [employer] espalda, [place], [place], abdomen o Mirna Chirag  Busque atención médica de inmediato si:   · Usted tiene un latasha dolor de myra o pérdida de la visión  · Usted tiene debilidad en un brazo o en umair pierna  Pregúntele a dickson [place] vitaminas y minerales son adecuados para usted  · Usted se siente mareado, confundido, somnoliento o thelma si se fuera a desmayar  · Usted se ha tomado dickson medicamento para la presión arterial demi dickson presión arterial todavía está más clotilde de lo que le indicó dickson médico     · Usted tiene preguntas o inquietudes acerca de dickson condición o cuidado    El tratamiento para la hipertensión crónica  puede incluir medicamentos para bajar la presión arterial y reducir el nivel de colesterol  Un nivel bajo de colesterol ayuda a prevenir enfermedades cardíacas y facilita el control de la presión arterial  La enfermedad cardíaca puede dificultar el control de la presión arterial  Es probable que usted también necesite hacer algunos cambios en dickson estilo de mike  Tómese mya medicamentos exactamente thelma se lo indicaron  Controle la hipertensión crónica:  Hable con dickson médico sobre las siguientes recomendaciones y otras formas de controlar la hipertensión:  · Tómese la presión arterial en dickson casa  Siéntese y descanse por 5 minutos antes de tomarse la presión arterial  Extienda dickson brazo y apóyelo en umair superficie plana  Dickson brazo debe estar a la misma altura que dickson corazón  Siga las instrucciones que vienen con el monitor para la presión arterial o tensiómetro  Si es posible tome por lo menos 2 lecturas de la presión cada vez  Tómese la presión arterial por lo [place] al día a la misma hora todos los días, umair en la mañana y la otra en la noche  Mantenga un registro de las lecturas de dickson presión arterial y llévelo consigo a mya consultas  Pregúntele a dickson médico cuál debería ser dickson presión arterial            · Limite el sodio (la sal) thelma se le haya indicado  Demasiado sodio puede afectar el equilibrio de líquidos  Revise las etiquetas para buscar alimentos bajos en sodio o sin sal agregada  Algunos alimentos bajos en sodio utilizan sales de potasio para añadir sabor  Demasiado potasio también puede causar problemas de [place]  Dickson médico le dirá qué cantidad de sodio y potasio es kumari para el consumo en un día  Él puede recomendarle que limite el sodio a 2,300 mg al día  · Siga el plan de comidas recomendado por dickson médico   Un dietista o médico puede darle más información sobre planes de bajo contenido de sodio o el plan de alimentación DASH (enfoques dietéticos para detener la hipertensión)   El plan DASH es bajo en sodio, grasas saturadas y grasa total  Es alto en potasio, calcio y [place]  · Ejercítese para mantener un peso saludable  Realice actividad física por lo menos 30 minutos al día, la mayoría de los días de la [place]  [place] ayudará a bajar pearson presión arterial  Pida más información acerca de un plan de ejercicio adecuado para usted  · [address] [facility] estrés  [place] podría ayudarlo a bajar pearson presión arterial  Aprenda sobre formas de relajarse, thelma respiración profunda o escuchar música  · Limite el consumo de alcohol  Las mujeres deberían limitar el consumo de alcohol a 1 bebida por día  Los hombres deberían limitar el consumo de alcohol a 2 tragos al día  Un trago equivale a 12 onzas de cerveza, 5 onzas de vino o 1 onza y ½ de licor  · No fume  La nicotina y otros químicos en los cigarrillos y cigarros pueden aumentar pearson presión arterial y también pueden provocar daño al pulmón  Pida información a pearson médico si usted actualmente fuma y necesita ayuda para dejar de fumar  Los cigarrillos electrónicos o tabaco sin humo todavía contienen nicotina  Consulte con pearson médico antes de [employer]  Acuda a mya consultas de control con pearson médico según le indicaron  Usted tendrá que regresar para que le revisen la presión arterial y para que le eligio otras pruebas de laboratorio  Anote mya preguntas para que se acuerde de hacerlas maureen mya visitas  © 2017 [address] Information is for End User's use only and may not be sold, redistributed or otherwise used for commercial purposes  All illustrations and images included in CareNotes® are the copyrighted property of A D A M , Inc  or Ace Martin  Esta información es sólo para uso en educación  Pearson intención no es darle un consejo médico sobre enfermedades o tratamientos  Colsulte con pearson Selene Jorje farmacéutico antes de seguir cualquier régimen médico para saber si es seguro y efectivo para usted

## 2020-08-14 NOTE — ASSESSMENT & PLAN NOTE
Patient is here for physical exam I found patient without any distress  Patient does chronic conditions Hypertension- I  Recommended him to  exercise at least 3 1/2  hours per week and maintain a healthy diet with low carbohydrate and low saturated fat    Patient understands the need for an annual physical exam

## 2020-08-14 NOTE — PROGRESS NOTES
Assessment/Plan:    Essential hypertension  Blood pressure is well control, patient will continue same treatment  Patient understands the risks associated with HTN and the need for adequate control and adherence to therapy  Explained to patient that therapeutic measure is lifelong lifestyle modification including:  Sodium reduction (<2 g/day)  Dietary Approaches to Stop Hypertension (DASH) diet (3 servings of fruit and vegetables daily, whole grains, low sodium, low-fat proteins)   Weight loss to BMI under 30 kg/m^2  Physical activity: 3 to 5 times/week of daily aerobic exercise for 30- to 50-minute sessions as tolerated   Avoid alcohol consumption  Encounter for general adult medical examination with abnormal findings  Patient is here for physical exam I found patient without any distress  Patient does chronic conditions Hypertension- I  Recommended him to  exercise at least 3 1/2  hours per week and maintain a healthy diet with low carbohydrate and low saturated fat  Patient understands the need for an annual physical exam            Diagnoses and all orders for this visit:    Encounter for general adult medical examination with abnormal findings    Screening for human immunodeficiency virus  -     HIV 1/2 Antigen/Antibody (4th Generation) w Reflex [facility]; Future    Essential hypertension          Subjective:      Patient ID: Anselmo Hannah is a 60 y o  male  Patient is here for PE, not having any acute distress  The following portions of the patient's history were reviewed and updated as appropriate: allergies, current medications, past family history, past medical history, past social history, past surgical history and problem list     Review of Systems   Constitutional: Negative for diaphoresis, fatigue, fever and unexpected weight change  Respiratory: Negative for apnea, cough, choking, chest tightness and shortness of breath      Cardiovascular: Negative for chest pain, palpitations and leg swelling  Gastrointestinal: Negative for abdominal distention, abdominal pain, anal bleeding, blood in stool and constipation  Musculoskeletal: Negative for arthralgias, back pain, gait problem and joint swelling  Neurological: Negative for dizziness, facial asymmetry, light-headedness and headaches  Psychiatric/Behavioral: Negative for behavioral problems, dysphoric mood and self-injury  The patient is not nervous/anxious  Objective:      /80 (BP Location: Left arm, Patient Position: Sitting, Cuff Size: Standard)   Pulse 80   Temp 97 8 °F (36 6 °C) (Temporal)   Resp 16   Ht 5' 8" (1 727 m)   Wt 86 2 kg (190 lb)   SpO2 97%   BMI 28 89 kg/m²          Physical Exam  Vitals signs and nursing note reviewed  Neck:      Musculoskeletal: No edema or neck rigidity  Thyroid: No thyroid mass or thyromegaly  Vascular: No carotid bruit or JVD  Trachea: No tracheal tenderness  Cardiovascular:      Rate and Rhythm: Normal rate and regular rhythm  No extrasystoles are present  Pulses: Normal pulses  Heart sounds: Normal heart sounds  Heart sounds not distant  No friction rub  Pulmonary:      Effort: Pulmonary effort is normal  No tachypnea or bradypnea  Breath sounds: Normal breath sounds  No stridor  Abdominal:      General: Bowel sounds are normal  There is no abdominal bruit  Palpations: Abdomen is soft  There is no hepatomegaly or splenomegaly  Hernia: No hernia is present  Genitourinary:     Prostate: Normal    Musculoskeletal: Normal range of motion  Skin:     General: Skin is warm and dry  Neurological:      Mental Status: He is oriented to person, place, and time  Deep Tendon Reflexes: Reflexes are normal and symmetric  Psychiatric:         Behavior: Behavior normal          Thought Content: Thought content normal          Judgment: Judgment normal        BMI Counseling: Body mass index is 28 89 kg/m²   The BMI is above normal  Exercise recommendations include exercising 3-5 times per week

## 2020-08-16 LAB — HIV 1+2 AB+HIV1 P24 AG SERPL QL IA: NORMAL

## 2020-08-20 NOTE — RESULT ENCOUNTER NOTE
Please call patient, risk of diabetes id high  Patient needs to exercise more and control the carbs in diet  We will repeat test in 6 months of sooner if necessary

## 2020-08-25 ENCOUNTER — TELEPHONE (OUTPATIENT)
Dept: FAMILY MEDICINE CLINIC | Facility: CLINIC | Age: 65
End: 2020-08-25

## 2020-08-25 NOTE — TELEPHONE ENCOUNTER
----- Message from Maddy Ha MD sent at 8/20/2020  8:48 AM EDT -----  Please call patient, risk of diabetes id high  Patient needs to exercise more and control the carbs in diet  We will repeat test in 6 months of sooner if necessary

## 2020-11-04 DIAGNOSIS — I10 ESSENTIAL HYPERTENSION: ICD-10-CM

## 2020-11-05 RX ORDER — PRAVASTATIN SODIUM 40 MG
40 TABLET ORAL DAILY
Qty: 90 TABLET | Refills: 3 | Status: SHIPPED | OUTPATIENT
Start: 2020-11-05 | End: 2021-02-15 | Stop reason: SDUPTHER

## 2020-11-05 RX ORDER — OLMESARTAN MEDOXOMIL, AMLODIPINE AND HYDROCHLOROTHIAZIDE TABLET 40/10/25 MG 40; 10; 25 MG/1; MG/1; MG/1
1 TABLET ORAL EVERY 24 HOURS
Qty: 90 TABLET | Refills: 3 | Status: SHIPPED | OUTPATIENT
Start: 2020-11-05 | End: 2021-02-15 | Stop reason: SDUPTHER

## 2021-02-15 ENCOUNTER — OFFICE VISIT (OUTPATIENT)
Dept: FAMILY MEDICINE CLINIC | Facility: CLINIC | Age: 66
End: 2021-02-15
Payer: COMMERCIAL

## 2021-02-15 ENCOUNTER — LAB (OUTPATIENT)
Dept: LAB | Facility: HOSPITAL | Age: 66
End: 2021-02-15
Payer: COMMERCIAL

## 2021-02-15 VITALS
DIASTOLIC BLOOD PRESSURE: 70 MMHG | HEART RATE: 88 BPM | RESPIRATION RATE: 16 BRPM | WEIGHT: 186 LBS | HEIGHT: 68 IN | SYSTOLIC BLOOD PRESSURE: 138 MMHG | BODY MASS INDEX: 28.19 KG/M2 | OXYGEN SATURATION: 99 % | TEMPERATURE: 98 F

## 2021-02-15 DIAGNOSIS — E78.00 PURE HYPERCHOLESTEROLEMIA: ICD-10-CM

## 2021-02-15 DIAGNOSIS — I10 ESSENTIAL HYPERTENSION: ICD-10-CM

## 2021-02-15 DIAGNOSIS — I10 ESSENTIAL HYPERTENSION: Primary | ICD-10-CM

## 2021-02-15 LAB
ALBUMIN SERPL BCP-MCNC: 4.9 G/DL (ref 3–5.2)
ALP SERPL-CCNC: 57 U/L (ref 43–122)
ALT SERPL W P-5'-P-CCNC: 18 U/L (ref 9–52)
ANION GAP SERPL CALCULATED.3IONS-SCNC: 10 MMOL/L (ref 5–14)
AST SERPL W P-5'-P-CCNC: 36 U/L (ref 17–59)
BILIRUB SERPL-MCNC: 0.4 MG/DL
BUN SERPL-MCNC: 12 MG/DL (ref 5–25)
CALCIUM SERPL-MCNC: 10 MG/DL (ref 8.4–10.2)
CHLORIDE SERPL-SCNC: 104 MMOL/L (ref 97–108)
CHOLEST SERPL-MCNC: 148 MG/DL
CO2 SERPL-SCNC: 27 MMOL/L (ref 22–30)
CREAT SERPL-MCNC: 1.06 MG/DL (ref 0.7–1.5)
GFR SERPL CREATININE-BSD FRML MDRD: 74 ML/MIN/1.73SQ M
GLUCOSE P FAST SERPL-MCNC: 116 MG/DL (ref 70–99)
HDLC SERPL-MCNC: 58 MG/DL
LDLC SERPL CALC-MCNC: 74 MG/DL
NONHDLC SERPL-MCNC: 90 MG/DL
POTASSIUM SERPL-SCNC: 4.1 MMOL/L (ref 3.6–5)
PROT SERPL-MCNC: 8.8 G/DL (ref 5.9–8.4)
SODIUM SERPL-SCNC: 141 MMOL/L (ref 137–147)
TRIGL SERPL-MCNC: 82 MG/DL

## 2021-02-15 PROCEDURE — 80053 COMPREHEN METABOLIC PANEL: CPT

## 2021-02-15 PROCEDURE — 80061 LIPID PANEL: CPT

## 2021-02-15 PROCEDURE — 36415 COLL VENOUS BLD VENIPUNCTURE: CPT

## 2021-02-15 PROCEDURE — 99214 OFFICE O/P EST MOD 30 MIN: CPT | Performed by: FAMILY MEDICINE

## 2021-02-15 RX ORDER — PRAVASTATIN SODIUM 40 MG
40 TABLET ORAL DAILY
Qty: 90 TABLET | Refills: 3 | Status: SHIPPED | OUTPATIENT
Start: 2021-02-15 | End: 2021-11-12

## 2021-02-15 RX ORDER — OLMESARTAN MEDOXOMIL, AMLODIPINE AND HYDROCHLOROTHIAZIDE TABLET 40/10/25 MG 40; 10; 25 MG/1; MG/1; MG/1
1 TABLET ORAL EVERY 24 HOURS
Qty: 90 TABLET | Refills: 3 | Status: SHIPPED | OUTPATIENT
Start: 2021-02-15 | End: 2021-11-12

## 2021-02-15 NOTE — PROGRESS NOTES
Assessment/Plan:  blood pressure improved control but still suboptimal control  Patient states exercising more frequently  I encouraged the patient more salt diet and continue exercising continue same treatment and follow-up in August 2021 for a physical exam   The normal numbers for blood pressure for his age was discussed  No problem-specific Assessment & Plan notes found for this encounter  Diagnoses and all orders for this visit:    Pure hypercholesterolemia  -     Lipid panel; Future    Essential hypertension  -     Comprehensive metabolic panel; Future  -     Lipid panel; Future    BMI 28 0-28 9,adult          Subjective:      Patient ID: Aquiles Diana is a 60 y o  male  Patient is here to follow HTN, patient states good compliance with treatment  Denies chest pain, shortness of breath, urinary problems  No exercise but follows low salt diet  Current medication includes: angiotensin II receptor antagonist and calcium channel blocker agents   Olmesartan-amLODIPine-HCTZ Tabs  pravastatin          The following portions of the patient's history were reviewed and updated as appropriate: allergies, current medications, past family history, past medical history, past social history, past surgical history and problem list     Review of Systems   Constitutional: Negative for diaphoresis, fatigue, fever and unexpected weight change  Respiratory: Negative for apnea, cough, choking, chest tightness and shortness of breath  Cardiovascular: Negative for chest pain, palpitations and leg swelling  Gastrointestinal: Negative for abdominal distention, abdominal pain, anal bleeding, blood in stool and constipation  Musculoskeletal: Negative for arthralgias, back pain, gait problem and joint swelling  Neurological: Negative for dizziness, facial asymmetry, light-headedness and headaches  Psychiatric/Behavioral: Negative for behavioral problems, dysphoric mood and self-injury   The patient is not nervous/anxious  Objective:      /70 (BP Location: Left arm, Patient Position: Sitting, Cuff Size: Standard)   Pulse 88   Temp 98 °F (36 7 °C) (Temporal)   Resp 16   Ht 5' 8" (1 727 m)   Wt 84 4 kg (186 lb)   SpO2 99%   BMI 28 28 kg/m²          Physical Exam  Vitals signs and nursing note reviewed  Neck:      Musculoskeletal: No edema or neck rigidity  Thyroid: No thyroid mass or thyromegaly  Vascular: No carotid bruit or JVD  Trachea: No tracheal tenderness  Cardiovascular:      Rate and Rhythm: Normal rate and regular rhythm  No extrasystoles are present  Pulses: Normal pulses  Heart sounds: Normal heart sounds  Heart sounds not distant  No friction rub  Pulmonary:      Effort: Pulmonary effort is normal  No tachypnea or bradypnea  Breath sounds: Normal breath sounds  No stridor  Abdominal:      General: Bowel sounds are normal  There is no abdominal bruit  Palpations: Abdomen is soft  There is no hepatomegaly or splenomegaly  Hernia: No hernia is present  Musculoskeletal: Normal range of motion  Skin:     General: Skin is warm and dry  Neurological:      Mental Status: He is oriented to person, place, and time  Deep Tendon Reflexes: Reflexes are normal and symmetric  Psychiatric:         Behavior: Behavior normal          Thought Content: Thought content normal          Judgment: Judgment normal          BMI Counseling: Body mass index is 28 28 kg/m²  The BMI is above normal  Exercise recommendations include exercising 3-5 times per week

## 2021-02-24 DIAGNOSIS — R73.9 HYPERGLYCEMIA: Primary | ICD-10-CM

## 2021-02-24 NOTE — RESULT ENCOUNTER NOTE
Please call patient, he has elevation of blood sugar in the past 3 times and there is suspicious of diabetes  We need to run a 2nd test to rule out diabetes  Hemoglobin A1c was ordered patient can have it done at any time, no fasting

## 2021-03-02 ENCOUNTER — TELEPHONE (OUTPATIENT)
Dept: FAMILY MEDICINE CLINIC | Facility: CLINIC | Age: 66
End: 2021-03-02

## 2021-03-02 NOTE — TELEPHONE ENCOUNTER
Call pt  LVM need to call office back  ----- Message from Mohinder Shirley MD sent at 2/24/2021  9:08 AM EST -----  Please call patient, he has elevation of blood sugar in the past 3 times and there is suspicious of diabetes  We need to run a 2nd test to rule out diabetes  Hemoglobin A1c was ordered patient can have it done at any time, no fasting

## 2021-03-13 ENCOUNTER — LAB (OUTPATIENT)
Dept: LAB | Facility: HOSPITAL | Age: 66
End: 2021-03-13
Payer: COMMERCIAL

## 2021-03-13 DIAGNOSIS — R73.9 HYPERGLYCEMIA: ICD-10-CM

## 2021-03-13 LAB
EST. AVERAGE GLUCOSE BLD GHB EST-MCNC: 117 MG/DL
HBA1C MFR BLD: 5.7 %

## 2021-03-13 PROCEDURE — 83036 HEMOGLOBIN GLYCOSYLATED A1C: CPT

## 2021-03-13 PROCEDURE — 36415 COLL VENOUS BLD VENIPUNCTURE: CPT

## 2021-11-11 DIAGNOSIS — I10 ESSENTIAL HYPERTENSION: ICD-10-CM

## 2021-11-12 RX ORDER — PRAVASTATIN SODIUM 40 MG
40 TABLET ORAL DAILY
Qty: 90 TABLET | Refills: 3 | Status: SHIPPED | OUTPATIENT
Start: 2021-11-12 | End: 2022-01-28 | Stop reason: SDUPTHER

## 2021-11-12 RX ORDER — OLMESARTAN MEDOXOMIL, AMLODIPINE AND HYDROCHLOROTHIAZIDE TABLET 40/10/25 MG 40; 10; 25 MG/1; MG/1; MG/1
1 TABLET ORAL EVERY 24 HOURS
Qty: 90 TABLET | Refills: 3 | Status: SHIPPED | OUTPATIENT
Start: 2021-11-12 | End: 2022-01-28 | Stop reason: SDUPTHER

## 2022-01-28 ENCOUNTER — LAB (OUTPATIENT)
Dept: LAB | Facility: HOSPITAL | Age: 67
End: 2022-01-28
Payer: COMMERCIAL

## 2022-01-28 ENCOUNTER — OFFICE VISIT (OUTPATIENT)
Dept: FAMILY MEDICINE CLINIC | Facility: CLINIC | Age: 67
End: 2022-01-28
Payer: COMMERCIAL

## 2022-01-28 VITALS
HEART RATE: 92 BPM | WEIGHT: 183 LBS | HEIGHT: 68 IN | DIASTOLIC BLOOD PRESSURE: 80 MMHG | TEMPERATURE: 98 F | RESPIRATION RATE: 20 BRPM | BODY MASS INDEX: 27.74 KG/M2 | OXYGEN SATURATION: 98 % | SYSTOLIC BLOOD PRESSURE: 130 MMHG

## 2022-01-28 DIAGNOSIS — Z00.01 ENCOUNTER FOR GENERAL ADULT MEDICAL EXAMINATION WITH ABNORMAL FINDINGS: Primary | ICD-10-CM

## 2022-01-28 DIAGNOSIS — Z00.01 ENCOUNTER FOR GENERAL ADULT MEDICAL EXAMINATION WITH ABNORMAL FINDINGS: ICD-10-CM

## 2022-01-28 DIAGNOSIS — I10 ESSENTIAL HYPERTENSION: ICD-10-CM

## 2022-01-28 LAB
ALBUMIN SERPL BCP-MCNC: 4.8 G/DL (ref 3–5.2)
ALP SERPL-CCNC: 60 U/L (ref 43–122)
ALT SERPL W P-5'-P-CCNC: 17 U/L
ANION GAP SERPL CALCULATED.3IONS-SCNC: 6 MMOL/L (ref 5–14)
AST SERPL W P-5'-P-CCNC: 41 U/L (ref 17–59)
BASOPHILS # BLD AUTO: 0 THOUSANDS/ΜL (ref 0–0.1)
BASOPHILS NFR BLD AUTO: 0 % (ref 0–1)
BILIRUB SERPL-MCNC: 0.71 MG/DL
BUN SERPL-MCNC: 18 MG/DL (ref 5–25)
CALCIUM SERPL-MCNC: 9.5 MG/DL (ref 8.4–10.2)
CHLORIDE SERPL-SCNC: 102 MMOL/L (ref 97–108)
CHOLEST SERPL-MCNC: 176 MG/DL
CO2 SERPL-SCNC: 30 MMOL/L (ref 22–30)
CREAT SERPL-MCNC: 1.2 MG/DL (ref 0.7–1.5)
EOSINOPHIL # BLD AUTO: 0.1 THOUSAND/ΜL (ref 0–0.4)
EOSINOPHIL NFR BLD AUTO: 2 % (ref 0–6)
ERYTHROCYTE [DISTWIDTH] IN BLOOD BY AUTOMATED COUNT: 13.2 %
GFR SERPL CREATININE-BSD FRML MDRD: 63 ML/MIN/1.73SQ M
GLUCOSE P FAST SERPL-MCNC: 116 MG/DL (ref 70–99)
HCT VFR BLD AUTO: 44.7 % (ref 41–53)
HDLC SERPL-MCNC: 63 MG/DL
HGB BLD-MCNC: 14.6 G/DL (ref 13.5–17.5)
LDLC SERPL CALC-MCNC: 103 MG/DL
LYMPHOCYTES # BLD AUTO: 2.9 THOUSANDS/ΜL (ref 0.5–4)
LYMPHOCYTES NFR BLD AUTO: 50 % (ref 25–45)
MCH RBC QN AUTO: 28.4 PG (ref 26–34)
MCHC RBC AUTO-ENTMCNC: 32.8 G/DL (ref 31–36)
MCV RBC AUTO: 87 FL (ref 80–100)
MONOCYTES # BLD AUTO: 0.5 THOUSAND/ΜL (ref 0.2–0.9)
MONOCYTES NFR BLD AUTO: 9 % (ref 1–10)
NEUTROPHILS # BLD AUTO: 2.3 THOUSANDS/ΜL (ref 1.8–7.8)
NEUTS SEG NFR BLD AUTO: 40 % (ref 45–65)
NONHDLC SERPL-MCNC: 113 MG/DL
PLATELET # BLD AUTO: 231 THOUSANDS/UL (ref 150–450)
PMV BLD AUTO: 9.2 FL (ref 8.9–12.7)
POTASSIUM SERPL-SCNC: 3.9 MMOL/L (ref 3.6–5)
PROT SERPL-MCNC: 8.8 G/DL (ref 5.9–8.4)
RBC # BLD AUTO: 5.16 MILLION/UL (ref 4.5–5.9)
SODIUM SERPL-SCNC: 138 MMOL/L (ref 137–147)
TRIGL SERPL-MCNC: 52 MG/DL
WBC # BLD AUTO: 5.8 THOUSAND/UL (ref 4.5–11)

## 2022-01-28 PROCEDURE — 1101F PT FALLS ASSESS-DOCD LE1/YR: CPT | Performed by: FAMILY MEDICINE

## 2022-01-28 PROCEDURE — 36415 COLL VENOUS BLD VENIPUNCTURE: CPT

## 2022-01-28 PROCEDURE — 80053 COMPREHEN METABOLIC PANEL: CPT

## 2022-01-28 PROCEDURE — 80061 LIPID PANEL: CPT

## 2022-01-28 PROCEDURE — 99397 PER PM REEVAL EST PAT 65+ YR: CPT | Performed by: FAMILY MEDICINE

## 2022-01-28 PROCEDURE — 85025 COMPLETE CBC W/AUTO DIFF WBC: CPT

## 2022-01-28 RX ORDER — PRAVASTATIN SODIUM 40 MG
40 TABLET ORAL DAILY
Qty: 90 TABLET | Refills: 3 | Status: SHIPPED | OUTPATIENT
Start: 2022-01-28 | End: 2022-02-09

## 2022-01-28 RX ORDER — OLMESARTAN MEDOXOMIL, AMLODIPINE AND HYDROCHLOROTHIAZIDE TABLET 40/10/25 MG 40; 10; 25 MG/1; MG/1; MG/1
1 TABLET ORAL EVERY 24 HOURS
Qty: 90 TABLET | Refills: 3 | Status: SHIPPED | OUTPATIENT
Start: 2022-01-28 | End: 2022-02-09

## 2022-01-28 NOTE — PROGRESS NOTES
Assessment/Plan:    No problem-specific Assessment & Plan notes found for this encounter  BMI Counseling: Body mass index is 27 83 kg/m²  The BMI is above normal  Nutrition recommendations include decreasing soda and/or juice intake, moderation in carbohydrate intake and increasing intake of lean protein  Exercise recommendations include exercising 3-5 times per week  Diagnoses and all orders for this visit:    Encounter for general adult medical examination with abnormal findings  -     Lipid panel; Future  -     Comprehensive metabolic panel; Future  -     CBC and differential; Future    BMI 27 0-27 9,adult    Essential hypertension  -     pravastatin (PRAVACHOL) 40 mg tablet; Take 1 tablet (40 mg total) by mouth daily  -     Olmesartan-amLODIPine-HCTZ 40-10-25 MG TABS; Take 1 tablet by mouth every 24 hours          Subjective:      Patient ID: Jack Klinefelter is a 73 y o  male  HPI  Patient is here for PE, not having any acute distress  The following portions of the patient's history were reviewed and updated as appropriate: allergies, current medications, past family history, past medical history, past social history, past surgical history and problem list     Review of Systems   Constitutional: Negative for diaphoresis, fatigue, fever and unexpected weight change  Respiratory: Negative for apnea, cough, choking, chest tightness and shortness of breath  Cardiovascular: Negative for chest pain, palpitations and leg swelling  Gastrointestinal: Negative for abdominal distention, abdominal pain, anal bleeding, blood in stool and constipation  Musculoskeletal: Negative for arthralgias, back pain, gait problem and joint swelling  Neurological: Negative for dizziness, facial asymmetry, light-headedness and headaches  Psychiatric/Behavioral: Negative for behavioral problems, dysphoric mood and self-injury  The patient is not nervous/anxious            Objective:      /80 (BP Location: Left arm, Patient Position: Sitting, Cuff Size: Standard)   Pulse 92   Temp 98 °F (36 7 °C) (Temporal)   Resp 20   Ht 5' 8" (1 727 m)   Wt 83 kg (183 lb)   SpO2 98%   BMI 27 83 kg/m²          Physical Exam  Vitals and nursing note reviewed  Neck:      Thyroid: No thyroid mass or thyromegaly  Vascular: No carotid bruit or JVD  Trachea: No tracheal tenderness  Cardiovascular:      Rate and Rhythm: Normal rate and regular rhythm  No extrasystoles are present  Pulses: Normal pulses  Heart sounds: Normal heart sounds  Heart sounds not distant  No friction rub  Pulmonary:      Effort: Pulmonary effort is normal  No tachypnea or bradypnea  Breath sounds: Normal breath sounds  No stridor  Abdominal:      General: Bowel sounds are normal  There is no abdominal bruit  Palpations: Abdomen is soft  There is no hepatomegaly or splenomegaly  Hernia: No hernia is present  Musculoskeletal:         General: Normal range of motion  Cervical back: No edema or rigidity  Skin:     General: Skin is warm and dry  Neurological:      Mental Status: He is oriented to person, place, and time  Deep Tendon Reflexes: Reflexes are normal and symmetric  Psychiatric:         Behavior: Behavior normal          Thought Content:  Thought content normal          Judgment: Judgment normal

## 2022-02-08 DIAGNOSIS — I10 ESSENTIAL HYPERTENSION: ICD-10-CM

## 2022-02-09 RX ORDER — PRAVASTATIN SODIUM 40 MG
40 TABLET ORAL DAILY
Qty: 90 TABLET | Refills: 3 | Status: SHIPPED | OUTPATIENT
Start: 2022-02-09

## 2022-02-09 RX ORDER — OLMESARTAN MEDOXOMIL, AMLODIPINE AND HYDROCHLOROTHIAZIDE TABLET 40/10/25 MG 40; 10; 25 MG/1; MG/1; MG/1
1 TABLET ORAL EVERY 24 HOURS
Qty: 90 TABLET | Refills: 3 | Status: SHIPPED | OUTPATIENT
Start: 2022-02-09

## 2022-10-03 ENCOUNTER — OFFICE VISIT (OUTPATIENT)
Dept: FAMILY MEDICINE CLINIC | Facility: CLINIC | Age: 67
End: 2022-10-03
Payer: COMMERCIAL

## 2022-10-03 ENCOUNTER — APPOINTMENT (OUTPATIENT)
Dept: LAB | Facility: HOSPITAL | Age: 67
End: 2022-10-03
Payer: COMMERCIAL

## 2022-10-03 VITALS
RESPIRATION RATE: 20 BRPM | HEART RATE: 96 BPM | BODY MASS INDEX: 27.58 KG/M2 | SYSTOLIC BLOOD PRESSURE: 150 MMHG | DIASTOLIC BLOOD PRESSURE: 90 MMHG | OXYGEN SATURATION: 98 % | HEIGHT: 68 IN | TEMPERATURE: 98 F | WEIGHT: 182 LBS

## 2022-10-03 DIAGNOSIS — R73.03 PRE-DIABETES: ICD-10-CM

## 2022-10-03 DIAGNOSIS — Z91.89 RISK FOR CORONARY ARTERY DISEASE BETWEEN 10% AND 20% IN NEXT 10 YEARS PER FRAMINGHAM SCORE: ICD-10-CM

## 2022-10-03 DIAGNOSIS — I10 ESSENTIAL HYPERTENSION: ICD-10-CM

## 2022-10-03 DIAGNOSIS — I10 ESSENTIAL HYPERTENSION: Primary | ICD-10-CM

## 2022-10-03 PROBLEM — Z00.01 ENCOUNTER FOR GENERAL ADULT MEDICAL EXAMINATION WITH ABNORMAL FINDINGS: Status: RESOLVED | Noted: 2019-06-17 | Resolved: 2022-10-03

## 2022-10-03 LAB
ALBUMIN SERPL BCP-MCNC: 5 G/DL (ref 3.5–5)
ALP SERPL-CCNC: 68 U/L (ref 43–122)
ALT SERPL W P-5'-P-CCNC: 23 U/L
ANION GAP SERPL CALCULATED.3IONS-SCNC: 10 MMOL/L (ref 5–14)
AST SERPL W P-5'-P-CCNC: 38 U/L (ref 17–59)
BILIRUB SERPL-MCNC: 0.48 MG/DL (ref 0.2–1)
BUN SERPL-MCNC: 15 MG/DL (ref 5–25)
CALCIUM SERPL-MCNC: 9.8 MG/DL (ref 8.4–10.2)
CHLORIDE SERPL-SCNC: 103 MMOL/L (ref 96–108)
CHOLEST SERPL-MCNC: 156 MG/DL
CO2 SERPL-SCNC: 28 MMOL/L (ref 21–32)
CREAT SERPL-MCNC: 1.14 MG/DL (ref 0.7–1.5)
EST. AVERAGE GLUCOSE BLD GHB EST-MCNC: 126 MG/DL
GFR SERPL CREATININE-BSD FRML MDRD: 66 ML/MIN/1.73SQ M
GLUCOSE P FAST SERPL-MCNC: 136 MG/DL (ref 70–99)
HBA1C MFR BLD: 6 %
HDLC SERPL-MCNC: 51 MG/DL
LDLC SERPL CALC-MCNC: 91 MG/DL
NONHDLC SERPL-MCNC: 105 MG/DL
POTASSIUM SERPL-SCNC: 4.8 MMOL/L (ref 3.5–5.3)
PROT SERPL-MCNC: 9.1 G/DL (ref 6.4–8.4)
SODIUM SERPL-SCNC: 141 MMOL/L (ref 135–147)
TRIGL SERPL-MCNC: 72 MG/DL

## 2022-10-03 PROCEDURE — 80053 COMPREHEN METABOLIC PANEL: CPT

## 2022-10-03 PROCEDURE — 36415 COLL VENOUS BLD VENIPUNCTURE: CPT

## 2022-10-03 PROCEDURE — 99214 OFFICE O/P EST MOD 30 MIN: CPT | Performed by: FAMILY MEDICINE

## 2022-10-03 PROCEDURE — 80061 LIPID PANEL: CPT

## 2022-10-03 PROCEDURE — 83036 HEMOGLOBIN GLYCOSYLATED A1C: CPT

## 2022-10-03 RX ORDER — PRAVASTATIN SODIUM 40 MG
40 TABLET ORAL DAILY
Qty: 90 TABLET | Refills: 3 | Status: SHIPPED | OUTPATIENT
Start: 2022-10-03

## 2022-10-03 RX ORDER — OLMESARTAN MEDOXOMIL, AMLODIPINE AND HYDROCHLOROTHIAZIDE TABLET 40/10/25 MG 40; 10; 25 MG/1; MG/1; MG/1
1 TABLET ORAL EVERY 24 HOURS
Qty: 90 TABLET | Refills: 3 | Status: SHIPPED | OUTPATIENT
Start: 2022-10-03

## 2022-10-03 NOTE — PROGRESS NOTES
Name: Mahamed Orellana      : 1955      MRN: [id number]  Encounter Provider: Javier Freeman MD  Encounter Date: 10/3/2022   Encounter department: [facility]    As per patient blood pressure home is well control, we will continue same treatment, repeat labs, patient will recheck blood pressure home and send message about numbers  Pre diabetes he will repeat hemoglobin A1c  He does not have any symptoms of diabetes  His BMI is elevated and patient compliant with exercise and diet  He also follows low-salt diet    High risk of coronary artery disease, , 14 4%  Goal is keep blood pressure under control, his previous cholesterol LDL from 2022 was one of three  it is close to target less than 70   1  Essential hypertension  -     Comprehensive metabolic panel; Future  -     Lipid panel; Future  -     pravastatin (PRAVACHOL) 40 mg tablet; Take 1 tablet (40 mg total) by mouth daily  -     Olmesartan-amLODIPine-HCTZ 40-10-25 MG TABS; Take 1 tablet by mouth every 24 hours    2  Pre-diabetes  -     HEMOGLOBIN A1C W/ EAG ESTIMATION; Future    3  BMI 27 0-27 9,adult    4  Risk for coronary artery disease between 10% and 20% in next 10 years per [place] score           Subjective      Patient is here to follow  HTN, patient states good compliance with treatment  Denies chest pain, shortness of breath, angina, urinary problems  No exercise but follows low salt diet  Patient states blood pressure home is average 124/80     Lab Results       Component                Value               Date/Time                  HGBA1C                   5 7 (H)             2021 07:06 AM        HGBA1C                   6 4 (H)             2018 07:29 PM        CHOLESTEROL              176                 2022 08:30 AM        LDLCALC                  103                 2022 08:30 AM        LDLCALC                  66 04/13/2018 07:29 PM        TRIG                     52                  01/28/2022 08:30 AM        TRIG                     66                  04/13/2018 07:29 PM        CREATININE               1 20                01/28/2022 08:30 AM        EGFR                     63                  01/28/2022 08:30 AM     Olmesartan-amLODIPine-HCTZ Tabs  pravastatin       Review of Systems   Constitutional: Negative for diaphoresis, fatigue, fever and unexpected weight change  Respiratory: Negative for apnea, cough, choking, chest tightness and shortness of breath  Cardiovascular: Negative for chest pain, palpitations and leg swelling  Gastrointestinal: Negative for abdominal distention, abdominal pain, anal bleeding, blood in stool and constipation  Musculoskeletal: Negative for arthralgias, back pain, gait problem and joint swelling  Neurological: Negative for dizziness, facial asymmetry, light-headedness and headaches  Psychiatric/Behavioral: Negative for behavioral problems, dysphoric mood and self-injury  The patient is not nervous/anxious  Current Outpatient Medications on File Prior to Visit   Medication Sig    [DISCONTINUED] Olmesartan-amLODIPine-HCTZ 40-10-25 MG TABS TAKE 1 TABLET BY MOUTH EVERY 24 HOURS    [DISCONTINUED] pravastatin (PRAVACHOL) 40 mg tablet TAKE 1 TABLET (40 MG TOTAL) BY MOUTH DAILY       Objective     /90 (BP Location: Left arm, Patient Position: Sitting, Cuff Size: Standard)   Pulse 96   Temp 98 °F (36 7 °C) (Temporal)   Resp 20   Ht 5' 8" (1 727 m)   Wt 82 6 kg (182 lb)   SpO2 98%   BMI 27 67 kg/m²     Physical Exam  Vitals and nursing note reviewed  Neck:      Thyroid: No thyroid mass or thyromegaly  Vascular: No carotid bruit or JVD  Trachea: No tracheal tenderness  Cardiovascular:      Rate and Rhythm: Normal rate and regular rhythm  No extrasystoles are present  Pulses: Normal pulses  Heart sounds: Normal heart sounds   Heart sounds not distant  No friction rub  Pulmonary:      Effort: Pulmonary effort is normal  No tachypnea or bradypnea  Breath sounds: Normal breath sounds  No stridor  Abdominal:      General: There is no abdominal bruit  Palpations: There is no hepatomegaly or splenomegaly  Hernia: No hernia is present  Musculoskeletal:         General: Normal range of motion  Cervical back: No edema or rigidity  Skin:     General: Skin is warm and dry  Neurological:      Mental Status: He is oriented to person, place, and time  Deep Tendon Reflexes: Reflexes are normal and symmetric  Psychiatric:         Behavior: Behavior normal          Thought Content: Thought content normal          Judgment: Judgment normal        Luis Felipe Fileds MD  BMI Counseling: Body mass index is 27 67 kg/m²  The BMI is above normal  Nutrition recommendations include consuming healthier snacks  Exercise recommendations include exercising 3-5 times per week  Also low salt diet

## 2023-05-01 ENCOUNTER — OFFICE VISIT (OUTPATIENT)
Dept: FAMILY MEDICINE CLINIC | Facility: CLINIC | Age: 68
End: 2023-05-01

## 2023-05-01 VITALS
OXYGEN SATURATION: 98 % | WEIGHT: 182 LBS | HEIGHT: 67 IN | SYSTOLIC BLOOD PRESSURE: 126 MMHG | HEART RATE: 92 BPM | TEMPERATURE: 98 F | RESPIRATION RATE: 16 BRPM | DIASTOLIC BLOOD PRESSURE: 80 MMHG | BODY MASS INDEX: 28.56 KG/M2

## 2023-05-01 DIAGNOSIS — R73.03 PRE-DIABETES: Primary | ICD-10-CM

## 2023-05-01 NOTE — ASSESSMENT & PLAN NOTE
It's possible that pravastatin has been causing this change  Patient would like to stop pravastatin and repeat labs in 6 months

## 2023-05-01 NOTE — PROGRESS NOTES
"Name: Gurinder Browne      : 1955      MRN: [id number]  Encounter Provider: Shayy Carbajal MD  Encounter Date: 2023   Encounter department: [facility]     1  Pre-diabetes  Assessment & Plan:  It's possible that pravastatin has been causing this change  Patient would like to stop pravastatin and repeat labs in 6 months  Subjective      Patient is here to review previous labs  Last 2 glycemias fasting were high about 126  By definition patient is diabetic  Hemoglobin A1c in October was reported 6 0%        Review of Systems    Current Outpatient Medications on File Prior to Visit   Medication Sig    Olmesartan-amLODIPine-HCTZ 40-10-25 MG TABS Take 1 tablet by mouth every 24 hours    [DISCONTINUED] pravastatin (PRAVACHOL) 40 mg tablet Take 1 tablet (40 mg total) by mouth daily       Objective     /80 (BP Location: Left arm, Patient Position: Sitting, Cuff Size: Standard)   Pulse 92   Temp 98 °F (36 7 °C) (Tympanic)   Resp 16   Ht 5' 7\" (1 702 m)   Wt 82 6 kg (182 lb)   SpO2 98%   BMI 28 51 kg/m²     Physical Exam  Shayy Carbajal MD   "

## 2023-08-06 DIAGNOSIS — I10 ESSENTIAL HYPERTENSION: ICD-10-CM

## 2023-08-07 RX ORDER — OLMESARTAN MEDOXOMIL, AMLODIPINE AND HYDROCHLOROTHIAZIDE TABLET 40/10/25 MG 40; 10; 25 MG/1; MG/1; MG/1
1 TABLET ORAL EVERY 24 HOURS
Qty: 90 TABLET | Refills: 3 | Status: SHIPPED | OUTPATIENT
Start: 2023-08-07

## 2023-10-16 ENCOUNTER — APPOINTMENT (OUTPATIENT)
Dept: LAB | Facility: HOSPITAL | Age: 68
End: 2023-10-16
Payer: COMMERCIAL

## 2023-10-16 ENCOUNTER — OFFICE VISIT (OUTPATIENT)
Dept: FAMILY MEDICINE CLINIC | Facility: CLINIC | Age: 68
End: 2023-10-16
Payer: COMMERCIAL

## 2023-10-16 VITALS
DIASTOLIC BLOOD PRESSURE: 80 MMHG | TEMPERATURE: 97.9 F | SYSTOLIC BLOOD PRESSURE: 136 MMHG | BODY MASS INDEX: 29.19 KG/M2 | HEIGHT: 67 IN | HEART RATE: 92 BPM | OXYGEN SATURATION: 98 % | RESPIRATION RATE: 16 BRPM | WEIGHT: 186 LBS

## 2023-10-16 DIAGNOSIS — R73.03 PRE-DIABETES: ICD-10-CM

## 2023-10-16 DIAGNOSIS — I10 ESSENTIAL HYPERTENSION: Primary | ICD-10-CM

## 2023-10-16 DIAGNOSIS — I10 ESSENTIAL HYPERTENSION: ICD-10-CM

## 2023-10-16 LAB
ALBUMIN SERPL BCP-MCNC: 5.2 G/DL (ref 3.5–5)
ALP SERPL-CCNC: 60 U/L (ref 34–104)
ALT SERPL W P-5'-P-CCNC: 16 U/L (ref 7–52)
ANION GAP SERPL CALCULATED.3IONS-SCNC: 10 MMOL/L
AST SERPL W P-5'-P-CCNC: 29 U/L (ref 13–39)
BILIRUB SERPL-MCNC: 0.48 MG/DL (ref 0.2–1)
BUN SERPL-MCNC: 22 MG/DL (ref 5–25)
CALCIUM SERPL-MCNC: 10.2 MG/DL (ref 8.4–10.2)
CHLORIDE SERPL-SCNC: 102 MMOL/L (ref 96–108)
CO2 SERPL-SCNC: 26 MMOL/L (ref 21–32)
CREAT SERPL-MCNC: 1.17 MG/DL (ref 0.6–1.3)
EST. AVERAGE GLUCOSE BLD GHB EST-MCNC: 126 MG/DL
GFR SERPL CREATININE-BSD FRML MDRD: 64 ML/MIN/1.73SQ M
GLUCOSE P FAST SERPL-MCNC: 126 MG/DL (ref 65–99)
HBA1C MFR BLD: 6 %
POTASSIUM SERPL-SCNC: 4 MMOL/L (ref 3.5–5.3)
PROT SERPL-MCNC: 8.6 G/DL (ref 6.4–8.4)
SODIUM SERPL-SCNC: 138 MMOL/L (ref 135–147)

## 2023-10-16 PROCEDURE — 36415 COLL VENOUS BLD VENIPUNCTURE: CPT

## 2023-10-16 PROCEDURE — 99214 OFFICE O/P EST MOD 30 MIN: CPT | Performed by: FAMILY MEDICINE

## 2023-10-16 PROCEDURE — 80053 COMPREHEN METABOLIC PANEL: CPT

## 2023-10-16 PROCEDURE — 83036 HEMOGLOBIN GLYCOSYLATED A1C: CPT

## 2023-10-16 RX ORDER — OLMESARTAN MEDOXOMIL, AMLODIPINE AND HYDROCHLOROTHIAZIDE TABLET 40/10/25 MG 40; 10; 25 MG/1; MG/1; MG/1
1 TABLET ORAL EVERY 24 HOURS
Qty: 90 TABLET | Refills: 3 | Status: SHIPPED | OUTPATIENT
Start: 2023-10-16

## 2023-10-16 NOTE — PROGRESS NOTES
[facility]      : 1955      MRN: [id number]  Encounter Provider: Chastity Blanton MD  Encounter Date: 10/16/2023   Encounter department: [address] [place] [address]     1. Essential hypertension  -     Comprehensive metabolic panel; Future  -     Olmesartan-amLODIPine-HCTZ 40-10-25 MG TABS; Take 1 tablet by mouth every 24 hours    2. Pre-diabetes  -     HEMOGLOBIN A1C W/ EAG ESTIMATION; Future    3. BMI 29.0-29.9,adult      BMI Counseling: Body mass index is 29.13 kg/m². The BMI is above normal. Nutrition recommendations include reducing portion sizes. Subjective:   Erma Cunningham, a 62-year-old male, presented for a follow-up visit for hypertension. He reported that his blood pressure readings at home have been normal, with a reading today of 129/81. He denied any chest pain or fatigue when climbing stairs. He also denied any swelling or pain in his legs. He reported that he is currently taking olmesartan with amlodipine 40/25 daily. He denied any issues with this medication and reported that he does not pay for it. Objective:    Physical Exam  Vitals and nursing note reviewed. Constitutional:       General: not in acute distress. Appearance:  obese with Body mass index is 29.13 kg/m². HENT:      Head: Normocephalic. Neck:      Thyroid: No thyromegaly. Cardiovascular:      Rate and Rhythm: Normal rate and regular rhythm. Heart sounds: Normal heart sounds. No murmur heard. Pulmonary:      Effort: Pulmonary effort is normal. No respiratory distress. Breath sounds: Normal breath sounds. No wheezing or rales. Abdominal:      General: Bowel sounds are normal. There is no distension. Palpations: Abdomen is soft. There is no mass. Tenderness: There is no abdominal tenderness. There is no guarding or rebound. Muscle-Skeletal normal.  Skin:     General: Skin is warm and dry.    Neurological:      Mental Status: patient is alert and oriented to person, place, and time. No focal deficits. Psychiatric:         Behavior: Behavior normal.         Thought Content: Thought content normal.         Judgment: Judgment normal.     On physical examination, Tano's neck was soft with no bruits. His heart sounds were normal with no murmurs. His lungs were clear to auscultation. His abdomen was soft with no masses. His legs showed no abnormalities. Neurological examination was unremarkable. Assessment & Plan:   Denishas hypertension appears to be well-controlled with his current medication regimen. He will continue with the same medication. He is advised to undergo a blood test to check his blood sugar levels and kidney function. He agreed to go to the laboratory today for the tests. He is scheduled for a follow-up visit on April 15th. He will be contacted to confirm the change in his appointment date.     Keren Lira MD

## 2024-04-15 ENCOUNTER — OFFICE VISIT (OUTPATIENT)
Dept: FAMILY MEDICINE CLINIC | Facility: CLINIC | Age: 69
End: 2024-04-15
Payer: COMMERCIAL

## 2024-04-15 ENCOUNTER — APPOINTMENT (OUTPATIENT)
Dept: LAB | Facility: HOSPITAL | Age: 69
End: 2024-04-15
Payer: COMMERCIAL

## 2024-04-15 VITALS
OXYGEN SATURATION: 98 % | BODY MASS INDEX: 29.51 KG/M2 | RESPIRATION RATE: 16 BRPM | HEART RATE: 96 BPM | HEIGHT: 67 IN | DIASTOLIC BLOOD PRESSURE: 80 MMHG | SYSTOLIC BLOOD PRESSURE: 130 MMHG | TEMPERATURE: 97.9 F | WEIGHT: 188 LBS

## 2024-04-15 DIAGNOSIS — Z00.01 ENCOUNTER FOR GENERAL ADULT MEDICAL EXAMINATION WITH ABNORMAL FINDINGS: Primary | ICD-10-CM

## 2024-04-15 DIAGNOSIS — E78.00 PURE HYPERCHOLESTEROLEMIA: ICD-10-CM

## 2024-04-15 DIAGNOSIS — I10 PRIMARY HYPERTENSION: ICD-10-CM

## 2024-04-15 DIAGNOSIS — Z00.01 ENCOUNTER FOR GENERAL ADULT MEDICAL EXAMINATION WITH ABNORMAL FINDINGS: ICD-10-CM

## 2024-04-15 LAB
ALBUMIN SERPL BCP-MCNC: 4.7 G/DL (ref 3.5–5)
ALP SERPL-CCNC: 52 U/L (ref 34–104)
ALT SERPL W P-5'-P-CCNC: 12 U/L (ref 7–52)
ANION GAP SERPL CALCULATED.3IONS-SCNC: 9 MMOL/L (ref 4–13)
AST SERPL W P-5'-P-CCNC: 23 U/L (ref 13–39)
BASOPHILS # BLD AUTO: 0.04 THOUSANDS/ÂΜL (ref 0–0.1)
BASOPHILS NFR BLD AUTO: 1 % (ref 0–1)
BILIRUB SERPL-MCNC: 0.49 MG/DL (ref 0.2–1)
BUN SERPL-MCNC: 18 MG/DL (ref 5–25)
CALCIUM SERPL-MCNC: 10 MG/DL (ref 8.4–10.2)
CHLORIDE SERPL-SCNC: 100 MMOL/L (ref 96–108)
CHOLEST SERPL-MCNC: 193 MG/DL
CO2 SERPL-SCNC: 27 MMOL/L (ref 21–32)
CREAT SERPL-MCNC: 1.12 MG/DL (ref 0.6–1.3)
EOSINOPHIL # BLD AUTO: 0.14 THOUSAND/ÂΜL (ref 0–0.61)
EOSINOPHIL NFR BLD AUTO: 2 % (ref 0–6)
ERYTHROCYTE [DISTWIDTH] IN BLOOD BY AUTOMATED COUNT: 12.3 % (ref 11.6–15.1)
GFR SERPL CREATININE-BSD FRML MDRD: 67 ML/MIN/1.73SQ M
GLUCOSE P FAST SERPL-MCNC: 118 MG/DL (ref 65–99)
HCT VFR BLD AUTO: 45.9 % (ref 36.5–49.3)
HDLC SERPL-MCNC: 57 MG/DL
HGB BLD-MCNC: 15.1 G/DL (ref 12–17)
IMM GRANULOCYTES # BLD AUTO: 0.03 THOUSAND/UL (ref 0–0.2)
IMM GRANULOCYTES NFR BLD AUTO: 0 % (ref 0–2)
LDLC SERPL CALC-MCNC: 110 MG/DL (ref 0–100)
LYMPHOCYTES # BLD AUTO: 3.53 THOUSANDS/ÂΜL (ref 0.6–4.47)
LYMPHOCYTES NFR BLD AUTO: 49 % (ref 14–44)
MCH RBC QN AUTO: 28.8 PG (ref 26.8–34.3)
MCHC RBC AUTO-ENTMCNC: 32.9 G/DL (ref 31.4–37.4)
MCV RBC AUTO: 88 FL (ref 82–98)
MONOCYTES # BLD AUTO: 0.61 THOUSAND/ÂΜL (ref 0.17–1.22)
MONOCYTES NFR BLD AUTO: 8 % (ref 4–12)
NEUTROPHILS # BLD AUTO: 2.89 THOUSANDS/ÂΜL (ref 1.85–7.62)
NEUTS SEG NFR BLD AUTO: 40 % (ref 43–75)
NRBC BLD AUTO-RTO: 0 /100 WBCS
PLATELET # BLD AUTO: 258 THOUSANDS/UL (ref 149–390)
PMV BLD AUTO: 10.7 FL (ref 8.9–12.7)
POTASSIUM SERPL-SCNC: 3.9 MMOL/L (ref 3.5–5.3)
PROT SERPL-MCNC: 8 G/DL (ref 6.4–8.4)
RBC # BLD AUTO: 5.24 MILLION/UL (ref 3.88–5.62)
SODIUM SERPL-SCNC: 136 MMOL/L (ref 135–147)
TRIGL SERPL-MCNC: 132 MG/DL
WBC # BLD AUTO: 7.24 THOUSAND/UL (ref 4.31–10.16)

## 2024-04-15 PROCEDURE — 80061 LIPID PANEL: CPT

## 2024-04-15 PROCEDURE — 36415 COLL VENOUS BLD VENIPUNCTURE: CPT

## 2024-04-15 PROCEDURE — 99213 OFFICE O/P EST LOW 20 MIN: CPT | Performed by: FAMILY MEDICINE

## 2024-04-15 PROCEDURE — 99397 PER PM REEVAL EST PAT 65+ YR: CPT | Performed by: FAMILY MEDICINE

## 2024-04-15 PROCEDURE — 85025 COMPLETE CBC W/AUTO DIFF WBC: CPT

## 2024-04-15 PROCEDURE — 80053 COMPREHEN METABOLIC PANEL: CPT

## 2024-04-15 RX ORDER — ATORVASTATIN CALCIUM 40 MG/1
40 TABLET, FILM COATED ORAL DAILY
Qty: 90 TABLET | Refills: 3 | Status: SHIPPED | OUTPATIENT
Start: 2024-04-15

## 2024-04-15 NOTE — PROGRESS NOTES
"Name: Tano Gutierrez      : 1955      MRN: [id number]  Encounter Provider: Sunny Man MD  Encounter Date: 4/15/2024   Encounter department: [facility] PRIMARY CARE [facility]   Chief Complaint  Follow-up for hypertension management and annual physical examination.    History of Present Illness  The patient is a 41-year-old male presenting for a routine follow-up of hypertension and an annual physical examination. He reports compliance with his antihypertensive medication and has been monitoring his blood pressure at home, with recent readings averaging 125/70 mmHg. He denies any symptoms of chest pain, shortness of breath, or dizziness. He has noticed some changes in his toenails but attributes this to physical trauma rather than a fungal infection.      Current Outpatient Medications:     atorvastatin (LIPITOR) 40 mg tablet, Take 1 tablet (40 mg total) by mouth daily, Disp: 90 tablet, Rfl: 3    Olmesartan-amLODIPine-HCTZ 40-10-25 MG TABS, Take 1 tablet by mouth every 24 hours, Disp: 90 tablet, Rfl: 3    Allergies   Allergen Reactions    No Active Allergies          Past Medical History  Hypertension.    Past Surgical History  No surgical history reported.    Social History  Works full-time in a physically demanding job, currently four days a week. No tobacco, alcohol, or illicit drug use reported.    Family History  No significant family medical history provided.    Review of Systems  Negative for headaches, vision changes, chest pain, palpitations, dyspnea, abdominal pain, or lower extremity swelling.    /80 (BP Location: Left arm, Patient Position: Sitting, Cuff Size: Standard)   Pulse 96   Temp 97.9 °F (36.6 °C) (Tympanic)   Resp 16   Ht 5' 7\" (1.702 m)   Wt 85.3 kg (188 lb)   SpO2 98%   BMI 29.44 kg/m²       Physical Exam  General: Patient is alert and oriented.  Cardiovascular: Regular rate and rhythm, no murmurs, rubs, or gallops.  Respiratory: Clear to " auscultation bilaterally, no wheezes, rales, or rhonchi.  Neck: Supple, no jugular venous distension, thyroid not enlarged.  Abdomen: Soft, non-tender, no hepatosplenomegaly, no masses palpated.  Extremities: No edema, changes noted in toenails of the left foot, likely traumatic in origin.  Skin: No rashes or lesions noted.  Neurological: Alert and oriented, cranial nerves II-XII grossly intact.    Lab Results  Pending blood work results.    Imaging and other Relevant Results  No imaging studies provided.    Assessment and Plan  Encounter for general adult medical examination with abnormal findings   Hypertension: The patient's blood pressure is well-controlled on current medication. Continue the same antihypertensive regimen. Encourage ongoing home blood pressure monitoring and lifestyle modifications including diet and exercise.  Annual Physical Examination: No acute concerns identified during today's examination. The patient is advised to maintain a healthy lifestyle and follow up as needed or at least annually.  Toenail Changes: Likely due to trauma as per patient's history. No signs of fungal infection. Advise monitoring and return if symptoms such as pain, redness, or swelling develop.  Laboratory Tests: Complete blood count, lipid profile, and comprehensive metabolic panel were ordered to monitor overall health and the effectiveness of hypertension management. Await results and follow up as necessary.    Additional Notes:  Patient provided history in [place]; translation was necessary for documentation.        Sunny Man MD   [facility] CARE [facility]

## 2024-06-20 DIAGNOSIS — I10 ESSENTIAL HYPERTENSION: ICD-10-CM

## 2024-06-20 RX ORDER — OLMESARTAN MEDOXOMIL, AMLODIPINE AND HYDROCHLOROTHIAZIDE TABLET 40/10/25 MG 40; 10; 25 MG/1; MG/1; MG/1
1 TABLET ORAL EVERY 24 HOURS
Qty: 90 TABLET | Refills: 3 | Status: SHIPPED | OUTPATIENT
Start: 2024-06-20

## 2024-10-22 ENCOUNTER — OFFICE VISIT (OUTPATIENT)
Dept: FAMILY MEDICINE CLINIC | Facility: CLINIC | Age: 69
End: 2024-10-22
Payer: COMMERCIAL

## 2024-10-22 ENCOUNTER — LAB (OUTPATIENT)
Dept: LAB | Facility: HOSPITAL | Age: 69
End: 2024-10-22
Payer: COMMERCIAL

## 2024-10-22 VITALS
TEMPERATURE: 98 F | DIASTOLIC BLOOD PRESSURE: 75 MMHG | HEART RATE: 87 BPM | BODY MASS INDEX: 29.66 KG/M2 | OXYGEN SATURATION: 97 % | HEIGHT: 67 IN | WEIGHT: 189 LBS | RESPIRATION RATE: 16 BRPM | SYSTOLIC BLOOD PRESSURE: 125 MMHG

## 2024-10-22 DIAGNOSIS — R73.03 PRE-DIABETES: ICD-10-CM

## 2024-10-22 DIAGNOSIS — I10 ESSENTIAL HYPERTENSION: ICD-10-CM

## 2024-10-22 DIAGNOSIS — I10 ESSENTIAL HYPERTENSION: Primary | ICD-10-CM

## 2024-10-22 DIAGNOSIS — E78.00 PURE HYPERCHOLESTEROLEMIA: ICD-10-CM

## 2024-10-22 LAB
ALBUMIN SERPL BCG-MCNC: 4.9 G/DL (ref 3.5–5)
ALP SERPL-CCNC: 50 U/L (ref 34–104)
ALT SERPL W P-5'-P-CCNC: 18 U/L (ref 7–52)
ANION GAP SERPL CALCULATED.3IONS-SCNC: 9 MMOL/L (ref 4–13)
AST SERPL W P-5'-P-CCNC: 25 U/L (ref 13–39)
BILIRUB SERPL-MCNC: 0.52 MG/DL (ref 0.2–1)
BUN SERPL-MCNC: 14 MG/DL (ref 5–25)
CALCIUM SERPL-MCNC: 9.8 MG/DL (ref 8.4–10.2)
CHLORIDE SERPL-SCNC: 102 MMOL/L (ref 96–108)
CO2 SERPL-SCNC: 28 MMOL/L (ref 21–32)
CREAT SERPL-MCNC: 1.05 MG/DL (ref 0.6–1.3)
EST. AVERAGE GLUCOSE BLD GHB EST-MCNC: 128 MG/DL
GFR SERPL CREATININE-BSD FRML MDRD: 72 ML/MIN/1.73SQ M
GLUCOSE P FAST SERPL-MCNC: 123 MG/DL (ref 65–99)
HBA1C MFR BLD: 6.1 %
POTASSIUM SERPL-SCNC: 4 MMOL/L (ref 3.5–5.3)
PROT SERPL-MCNC: 8.1 G/DL (ref 6.4–8.4)
SODIUM SERPL-SCNC: 139 MMOL/L (ref 135–147)

## 2024-10-22 PROCEDURE — 80053 COMPREHEN METABOLIC PANEL: CPT

## 2024-10-22 PROCEDURE — 83036 HEMOGLOBIN GLYCOSYLATED A1C: CPT

## 2024-10-22 PROCEDURE — 99214 OFFICE O/P EST MOD 30 MIN: CPT | Performed by: FAMILY MEDICINE

## 2024-10-22 PROCEDURE — 36415 COLL VENOUS BLD VENIPUNCTURE: CPT

## 2024-10-22 RX ORDER — ATORVASTATIN CALCIUM 40 MG/1
40 TABLET, FILM COATED ORAL DAILY
Qty: 90 TABLET | Refills: 3 | Status: SHIPPED | OUTPATIENT
Start: 2024-10-22

## 2024-10-22 RX ORDER — OLMESARTAN MEDOXOMIL, AMLODIPINE AND HYDROCHLOROTHIAZIDE TABLET 40/10/25 MG 40; 10; 25 MG/1; MG/1; MG/1
1 TABLET ORAL EVERY 24 HOURS
Qty: 90 TABLET | Refills: 3 | Status: SHIPPED | OUTPATIENT
Start: 2024-10-22

## 2024-10-22 NOTE — ASSESSMENT & PLAN NOTE
Lab Results   Component Value Date    LDLCALC 110 (H) 04/15/2024     Repeat lipid profile. Continue on Statin therapy.  Orders:    atorvastatin (LIPITOR) 40 mg tablet; Take 1 tablet (40 mg total) by mouth daily

## 2024-10-22 NOTE — PROGRESS NOTES
Ambulatory Visit  Name: Tano Gutierrez      : 1955      MRN: [id number]  Encounter Provider: Sunny Man MD  Encounter Date: 10/22/2024   Encounter department: [facility] PRIMARY CARE [facility]    Assessment & Plan  Essential hypertension   Hypertension:  - Office BP elevated (144/87 mmHg) compared to home readings (125/75 mmHg)  - Continue current antihypertensive regimen as home BP is well-controlled  - Patient to continue home BP monitoring  - Ordered blood work for further evaluation  Routine Health Maintenance:  - Scheduled follow-up appointment for   - Emphasized importance of completing ordered blood tests before next visit  Orders:    Comprehensive metabolic panel; Future    Olmesartan-amLODIPine-HCTZ 40-10-25 MG TABS; Take 1 tablet by mouth every 24 hours    Pre-diabetes  Patient has elevation of blood sugar without diagnosis of diabetes.  Risk explained, follow up in 3 months after life style modification that was explained to patient and committed to proceed withy..    Orders:    Hemoglobin A1C; Future    Pure hypercholesterolemia  Lab Results   Component Value Date    LDLCALC 110 (H) 04/15/2024     Repeat lipid profile. Continue on Statin therapy.  Orders:    atorvastatin (LIPITOR) 40 mg tablet; Take 1 tablet (40 mg total) by mouth daily       History of Present Illness     HPI  Chief Complaint   Patient presents with    Hypertension    69-year-old male presents for follow-up of hypertension. Patient reports discrepancy between home and office blood pressure readings, with home measurements being lower. He denies headaches, chest pain, or leg swelling. Patient mentions he has been coming to this clinic for about 14 years for regular check-ups and blood work.  History obtained from patient.    Review of Systems  Past Medical History:   Diagnosis Date    Benign essential hypertension 2014    Hyperlipidemia     Hypertension     Obesity     Pre-diabetes      Past  "Surgical History:   Procedure Laterality Date    COLONOSCOPY  05/23/2018    diverticulosis               10/2017Fit test negative           Objective     /75   Pulse 87   Temp 98 °F (36.7 °C) (Tympanic)   Resp 16   Ht 5' 7\" (1.702 m)   Wt 85.7 kg (189 lb)   SpO2 97%   BMI 29.60 kg/m²     Physical Exam  Vital Signs:  - BP (office): 144/87 mmHg  - BP (home, reported): 125/75 mmHg  Physical Examination:  - Cardiovascular: Regular rate and rhythm, no murmurs  - Pulmonary: Clear to auscultation bilaterally  - Vascular: No carotid bruits  - Extremities: No edema noted    I have spent 35 minutes with Tano today in which greater than 50% of this time was spent in counseling/coordination of care regarding Diagnostic results, Prognosis, Risks and benefits of tx options, Counseling / Coordination of care, Reviewing / ordering tests, medicine, procedures.  Please note this time includes cumulative time on the day of encounter, including reviewing medical records and/or coordinating care among the patient's other specialists.      "

## 2024-10-22 NOTE — ASSESSMENT & PLAN NOTE
Patient has elevation of blood sugar without diagnosis of diabetes.  Risk explained, follow up in 3 months after life style modification that was explained to patient and committed to proceed withy..    Orders:    Hemoglobin A1C; Future